# Patient Record
Sex: FEMALE | Race: OTHER | HISPANIC OR LATINO | URBAN - METROPOLITAN AREA
[De-identification: names, ages, dates, MRNs, and addresses within clinical notes are randomized per-mention and may not be internally consistent; named-entity substitution may affect disease eponyms.]

---

## 2018-02-27 ENCOUNTER — TRANSCRIBE ORDERS (OUTPATIENT)
Dept: LAB | Facility: HOSPITAL | Age: 38
End: 2018-02-27

## 2020-03-02 ENCOUNTER — OFFICE VISIT (OUTPATIENT)
Dept: OBGYN CLINIC | Facility: CLINIC | Age: 40
End: 2020-03-02
Payer: COMMERCIAL

## 2020-03-02 VITALS
DIASTOLIC BLOOD PRESSURE: 86 MMHG | HEIGHT: 62 IN | SYSTOLIC BLOOD PRESSURE: 142 MMHG | BODY MASS INDEX: 35.88 KG/M2 | WEIGHT: 195 LBS

## 2020-03-02 DIAGNOSIS — N80.9 ENDOMETRIOSIS DETERMINED BY LAPAROSCOPY: ICD-10-CM

## 2020-03-02 DIAGNOSIS — G90.521 COMPLEX REGIONAL PAIN SYNDROME TYPE 1 AFFECTING RIGHT PELVIC REGION AND THIGH: Primary | ICD-10-CM

## 2020-03-02 DIAGNOSIS — R10.2 PELVIC PAIN: ICD-10-CM

## 2020-03-02 PROCEDURE — 99204 OFFICE O/P NEW MOD 45 MIN: CPT | Performed by: OBSTETRICS & GYNECOLOGY

## 2020-03-02 RX ORDER — NICOTINE POLACRILEX 2 MG
1 GUM BUCCAL EVERY OTHER DAY
COMMUNITY

## 2020-03-02 RX ORDER — UREA 10 %
800 LOTION (ML) TOPICAL DAILY
COMMUNITY

## 2020-03-02 NOTE — PROGRESS NOTES
The patient is here because she is having pelvic pain in her lower right side  The patient is having a sharp pain  It is painful when she walks and she has painful intercourse  She has had the cramping for 6-7 months  No bleeding or vaginal discharge  She had a hysterectomy and has her right ovary  She last saw a gyn in [facility] in 1/2019 or 12/2018

## 2020-03-02 NOTE — PROGRESS NOTES
Assessment/Plan:     1  Chronic right pelvic pain  2  History of endometriosis  3  Status post LAVH RSO for stage IV endometriosis   4  Status post diagnostic laparoscopy 2017 per a right cystectomy and lysis of adhesions 5  Status post 6 months treatment of Lupron last shot 2018  Plan:  1  Obtain records 2  Request for pelvic ultrasound given  3  Information on orlissa  given   4  Laparoscopic RSO discussed     Subjective:     Patient ID: Kelly Jimenez is a 37 y  o  female presents as a new patient who just moved from [place] complaining of right chronic pelvic pain x6 months  Her history significant for endometriosis resulting and LAVH LSO in   She was doing well up until  17 when she underwent a diagnostic laparoscopy for what she states was a cystectomy the right ovary and possible lysis of adhesions  This was followed by 6 months of Lupron which ended 2018  She had a normal [address] prior to treatment  Her 2 deliveries were  at term  Her pain with endometriosis started at age 19  Presently she has sharp pain on her right side worse with standing or bending and also states she has dyspareunia  She denies any bowel issues and occasional urinary frequency  She does states she drinks a lot of water  She also had a laparoscopic cholecystectomy  Objective:     Physical Exam   Abdominal: Soft  Bowel sounds are normal  She exhibits no distension and no mass  There is no tenderness  There is no rebound and no guarding  No hernia  Laparoscopy sites well healed   Genitourinary: Vagina normal    Genitourinary Comments: External genitalia normal vagina clear vaginal cuff well supported  No cystocele or rectocele noted  No discomfort on insertion of speculum  No tenderness on the left side  Positive tenderness in the right adnexa no masses appreciated  No vaginal cuff tenderness

## 2020-03-07 ENCOUNTER — HOSPITAL ENCOUNTER (OUTPATIENT)
Dept: RADIOLOGY | Facility: HOSPITAL | Age: 40
Discharge: HOME | End: 2020-03-07
Attending: OBSTETRICS & GYNECOLOGY
Payer: COMMERCIAL

## 2020-03-07 DIAGNOSIS — R10.2 PELVIC PAIN: ICD-10-CM

## 2020-03-07 DIAGNOSIS — N80.9 ENDOMETRIOSIS DETERMINED BY LAPAROSCOPY: ICD-10-CM

## 2020-03-07 PROCEDURE — 76856 US EXAM PELVIC COMPLETE: CPT

## 2020-03-07 PROCEDURE — 76830 TRANSVAGINAL US NON-OB: CPT

## 2020-03-13 ENCOUNTER — TELEPHONE (OUTPATIENT)
Dept: OBGYN CLINIC | Facility: CLINIC | Age: 40
End: 2020-03-13

## 2020-03-13 NOTE — TELEPHONE ENCOUNTER
I advised pt that her pelvic u/s was normal   Pt wants to know what the next step is    Does she need an appt to discuss or can you tell her what her options are?

## 2020-03-18 NOTE — TELEPHONE ENCOUNTER
Left pt a detailed message that she needs to schedule an OV with Dr Lourdes Borrego to discuss treatment options

## 2020-03-19 ENCOUNTER — OFFICE VISIT (OUTPATIENT)
Dept: OBGYN CLINIC | Facility: CLINIC | Age: 40
End: 2020-03-19
Payer: COMMERCIAL

## 2020-03-19 VITALS
DIASTOLIC BLOOD PRESSURE: 80 MMHG | HEIGHT: 63 IN | BODY MASS INDEX: 35.61 KG/M2 | SYSTOLIC BLOOD PRESSURE: 160 MMHG | WEIGHT: 201 LBS

## 2020-03-19 DIAGNOSIS — R10.2 PELVIC PAIN: Primary | ICD-10-CM

## 2020-03-19 DIAGNOSIS — N80.9 ENDOMETRIOSIS DETERMINED BY LAPAROSCOPY: ICD-10-CM

## 2020-03-19 PROCEDURE — 99213 OFFICE O/P EST LOW 20 MIN: CPT | Performed by: OBSTETRICS & GYNECOLOGY

## 2020-03-19 NOTE — PROGRESS NOTES
Patient returns to discussed ultrasound findings  See previous note  Patient has a history of endometriosis  Status post hysterectomy and LSO  Persistent right pelvic pain  Status post diagnostic laparoscopy 2017 for lysis of adhesions and right cystectomy  This was followed by 6 months of Lupron  Pelvic ultrasound reveals a normal ovary with no other abnormalities  Impression:  1  History of endometriosis status post hysterectomy LSO 2  Persistent right pelvic pain 3  Normal right ovary on ultrasound  Plan:  Trial of [id number] mg daily    If no relief of right pelvic pain n 3 months, schedule diagnostic laparoscopy possible RSO

## 2020-03-19 NOTE — PROGRESS NOTES
The patient is here to discuss treatment options for pelvic pain  The patient has had pelvic pain for a few months  No bleeding

## 2020-03-30 ENCOUNTER — TELEPHONE (OUTPATIENT)
Dept: OBGYN CLINIC | Facility: CLINIC | Age: 40
End: 2020-03-30

## 2020-03-30 RX ORDER — OXYCODONE HYDROCHLORIDE AND ACETAMINOPHEN 5; 325 MG/1; MG/1
1 TABLET ORAL EVERY 4 HOURS PRN
Qty: 10 TABLET | Refills: 0 | Status: SHIPPED | OUTPATIENT
Start: 2020-03-30 | End: 2022-05-18 | Stop reason: ALTCHOICE

## 2020-03-30 NOTE — TELEPHONE ENCOUNTER
Pt is calling asking if there is anything stronger that you can give her for her pain and being uncomfortable until the Linnell Riedel is approved  She states it is uncomfortable for her to sit  She has been alternating tylenol with ibuprofen but she is getting such an upset stomach  Please advise

## 2020-04-01 DIAGNOSIS — R10.2 PELVIC PAIN: ICD-10-CM

## 2020-04-01 DIAGNOSIS — N80.9 ENDOMETRIOSIS DETERMINED BY LAPAROSCOPY: Primary | ICD-10-CM

## 2020-04-21 ENCOUNTER — TELEPHONE (OUTPATIENT)
Dept: OBGYN CLINIC | Facility: CLINIC | Age: 40
End: 2020-04-21

## 2020-06-30 ENCOUNTER — TELEPHONE (OUTPATIENT)
Dept: OBGYN CLINIC | Facility: CLINIC | Age: 40
End: 2020-06-30

## 2020-06-30 NOTE — TELEPHONE ENCOUNTER
Pt on Orilissa since April  Still having dyspareunia and moderate cramping pelvic pain  Also, having hot flashes daily where she needs to change her shirt and it's embarrassing at work  Has constant headaches and nausea  Is hydrating  She is questioning continuation at this time due to these symptoms or is there anything to help with these symptoms?

## 2020-07-01 ENCOUNTER — TELEPHONE (OUTPATIENT)
Dept: OBGYN CLINIC | Facility: CLINIC | Age: 40
End: 2020-07-01

## 2020-07-01 NOTE — TELEPHONE ENCOUNTER
The [address] [place] [address] called to follow up on a prior-authorization for Dexter  She said to call back at [phone]

## 2020-07-02 NOTE — TELEPHONE ENCOUNTER
I called [facility] and spoke to Delores  I advised her that pt doesn't want to take the medication due to her side effects  After her appt with Dr Donte Cloud if she decides to try it again I will get authoriztion for it

## 2020-07-02 NOTE — TELEPHONE ENCOUNTER
Spoke with pt  She is going to check her schedule at work and see when she can come in for an appt  She does have an appt scheduled for 7/27/20 but did want to try to be seen soone

## 2020-07-09 NOTE — TELEPHONE ENCOUNTER
Left message asking pt if she was able to check her schedule for a sooner appt then what she is scheduled for

## 2020-08-03 ENCOUNTER — NURSE TRIAGE (OUTPATIENT)
Dept: OTHER | Facility: OTHER | Age: 40
End: 2020-08-03

## 2020-08-03 DIAGNOSIS — Z20.822 ENCOUNTER FOR LABORATORY TESTING FOR SEVERE ACUTE RESPIRATORY SYNDROME CORONAVIRUS 2 (SARS-COV-2): Primary | ICD-10-CM

## 2020-08-03 DIAGNOSIS — Z20.822 ENCOUNTER FOR LABORATORY TESTING FOR SEVERE ACUTE RESPIRATORY SYNDROME CORONAVIRUS 2 (SARS-COV-2): ICD-10-CM

## 2020-08-03 PROCEDURE — U0003 INFECTIOUS AGENT DETECTION BY NUCLEIC ACID (DNA OR RNA); SEVERE ACUTE RESPIRATORY SYNDROME CORONAVIRUS 2 (SARS-COV-2) (CORONAVIRUS DISEASE [COVID-19]), AMPLIFIED PROBE TECHNIQUE, MAKING USE OF HIGH THROUGHPUT TECHNOLOGIES AS DESCRIBED BY CMS-2020-01-R: HCPCS | Performed by: FAMILY MEDICINE

## 2020-08-03 NOTE — TELEPHONE ENCOUNTER
Regarding: [id number] (1 of 4)  ----- Message from Amy Vazquez sent at 8/3/2020  9:21 AM EDT -----  "I would like a COVID-19 test "

## 2020-08-03 NOTE — TELEPHONE ENCOUNTER
Reason for Disposition   [1] Travel from area with community spread (identified by [facility]) AND [2] within last 14 days BUT [3] NO symptoms    Answer Assessment - Initial Assessment Questions  1  CLOSE CONTACT: "Who is the person with the confirmed or suspected COVID-19 infection that you were exposed to?"      *No Answer*  2  PLACE of CONTACT: "Where were you when you were exposed to COVID-19?" (e g , home, school, medical waiting room; which city?)      *No Answer*  3  TYPE of CONTACT: "How much contact was there?" (e g , sitting next to, live in same house, work in same office, same building)      Denied known exposure  6  TRAVEL: "Have you traveled out of the country recently?" If so, "When and where?"      * Also ask about out-of-state travel, since the CDC has identified some high-risk cities for community spread in the [address] [place] [address],[address]  * Note: Travel becomes less relevant if there is widespread community transmission where the patient lives  Drove to [place], [place] on 7/30/2020-8/1/2020  7  COMMUNITY SPREAD: "Are there lots of cases of COVID-19 (community spread) where you live?" (See public health department website, if unsure)        Lives in [place], [place]  8  SYMPTOMS: "Do you have any symptoms?" (e g , fever, cough, breathing difficulty)      Asymptomatic  9  PREGNANCY OR POSTPARTUM: "Is there any chance you are pregnant?" "When was your last menstrual period?" "Did you deliver in the last 2 weeks?"    Had a hysterectomy 2013  10  HIGH RISK: "Do you have any heart or lung problems?  Do you have a weak immune system?" (e g , CHF, COPD, asthma, HIV positive, chemotherapy, renal failure, diabetes mellitus, sickle cell anemia)        Denied    Protocols used: CORONAVIRUS (COVID-19) EXPOSURE-ADULT-[place]

## 2020-08-04 LAB — SARS-COV-2 RNA SPEC QL NAA+PROBE: NOT DETECTED

## 2020-08-06 ENCOUNTER — OFFICE VISIT (OUTPATIENT)
Dept: OBGYN CLINIC | Facility: CLINIC | Age: 40
End: 2020-08-06
Payer: COMMERCIAL

## 2020-08-06 VITALS
HEIGHT: 64 IN | BODY MASS INDEX: 34.31 KG/M2 | DIASTOLIC BLOOD PRESSURE: 82 MMHG | WEIGHT: 201 LBS | SYSTOLIC BLOOD PRESSURE: 140 MMHG

## 2020-08-06 DIAGNOSIS — N80.9 ENDOMETRIOSIS: Primary | ICD-10-CM

## 2020-08-06 DIAGNOSIS — N95.8 ARTIFICIAL MENOPAUSE: ICD-10-CM

## 2020-08-06 PROCEDURE — 99213 OFFICE O/P EST LOW 20 MIN: CPT | Performed by: OBSTETRICS & GYNECOLOGY

## 2020-08-06 RX ORDER — ACETAMINOPHEN AND CODEINE PHOSPHATE 120; 12 MG/5ML; MG/5ML
1 SOLUTION ORAL DAILY
Qty: 30 TABLET | Refills: 12 | Status: SHIPPED | OUTPATIENT
Start: 2020-08-06 | End: 2022-05-18 | Stop reason: ALTCHOICE

## 2020-08-06 NOTE — PROGRESS NOTES
The patient is here for a follow-up for orilissa  The patient was having severe hot flashes,frequent headaches and the patient felt nausea while on the orilissa  The patient was on orilissa for about three months  Once she stopped the [place], most of the symptoms stopped  She still has hot flashes once in a while

## 2020-08-06 NOTE — PROGRESS NOTES
Returns to the office complaining of hot flashes and night sweats on Oroilisa for the treatment of endometriosis  Patient states that pain with intercourse has improved  Patient is status post hysterectomy and LSO  Has her right ovary with a normal ultrasound  Patient would like to continue the medication and asked if there was any help for the hot flashes  At the present time we can add progesterone only pill  Impression:  Pain of endometriosis improving on Orillissa  Severe hot flashes and night sweats  Plan:  Restart medication in add progesterone only pill  Call with update in a few weeks  Return to office in 3 months for re-evaluation  As stated before, if pain is not getting in better we may plan a laparoscopic RSO    Patient understands the implications of surgical menopause and ERT was discussed

## 2020-08-20 ENCOUNTER — TELEPHONE (OUTPATIENT)
Dept: OBGYN CLINIC | Facility: CLINIC | Age: 40
End: 2020-08-20

## 2020-08-20 NOTE — TELEPHONE ENCOUNTER
Spoke with Dr Donte Cloud and pt is taking Micronor birth control  Pt will take this as her trial of birth control  Pt advised

## 2020-08-20 NOTE — TELEPHONE ENCOUNTER
I went through the steps of trying to get prior authorization for pt's Zaira Knowles and it was denied because the pt hasn't tried 3 months of oral contraceptives and failed  I have not notified the pt yet  What would you like to do?

## 2020-09-24 ENCOUNTER — OFFICE VISIT (OUTPATIENT)
Dept: OBGYN CLINIC | Facility: CLINIC | Age: 40
End: 2020-09-24
Payer: COMMERCIAL

## 2020-09-24 VITALS
WEIGHT: 207 LBS | DIASTOLIC BLOOD PRESSURE: 84 MMHG | BODY MASS INDEX: 35.34 KG/M2 | SYSTOLIC BLOOD PRESSURE: 140 MMHG | HEIGHT: 64 IN

## 2020-09-24 DIAGNOSIS — N64.4 PAIN OF BOTH BREASTS: Primary | ICD-10-CM

## 2020-09-24 PROCEDURE — 99213 OFFICE O/P EST LOW 20 MIN: CPT | Performed by: OBSTETRICS & GYNECOLOGY

## 2020-09-24 RX ORDER — MELOXICAM 15 MG/1
TABLET ORAL
COMMUNITY
Start: 2020-07-28 | End: 2022-05-18 | Stop reason: ALTCHOICE

## 2020-09-24 NOTE — PROGRESS NOTES
The patient is here because she is having bilateral breast pain  The pain is underneath her armpit and on the sides of her breast  She has sharp pain on the sides of her breast  She has the pain when she moves and randomly on and off  If she lifts her arms or moves in bed, she has pain  She has had the pain since 3/2020  She feels like the sides of her breasts are swollen

## 2020-09-24 NOTE — PROGRESS NOTES
Presents to the office complaining of bilateral breast pain in the outer aspect radiating to her axilla  Patient states that they are pain is worse at night  She describes the pain in her breast as shooting pain  She denies any masses skin changes or nipple discharge  Her history significant for hysterectomy LSO for severe endometriosis  She was on Orilissa for endometriosis and was later placed on progesterone only pill to alleviate the hot flashes and night sweats on her medication  She stopped herOrilissa 2 months ago and has been on progesterone only pill  She received a steroid injection for her left ankle and has noted weight gain  She was seen in August of 2020 at which time her weight was 201 lb  She is presently 207 lb  She has not changed her bra size since the weight gain  Physical exam:  Breast exam:  No skin changes noted nipple discharge no masses no axillary nodes bilaterally    Impression:  New onset of bilateral breast pain  Recent 6 lb weight gain  On a progesterone only pill  Plan:  Check diagnostic bilateral mammogram   Stop progesterone only pill  Encouraged to lose weight  Considered getting a new bra    Call with update

## 2020-11-05 ENCOUNTER — OFFICE VISIT (OUTPATIENT)
Dept: OBGYN CLINIC | Facility: CLINIC | Age: 40
End: 2020-11-05
Payer: COMMERCIAL

## 2020-11-05 VITALS
DIASTOLIC BLOOD PRESSURE: 80 MMHG | SYSTOLIC BLOOD PRESSURE: 140 MMHG | BODY MASS INDEX: 36.44 KG/M2 | HEIGHT: 62 IN | WEIGHT: 198 LBS

## 2020-11-05 DIAGNOSIS — N80.9 ENDOMETRIOSIS DETERMINED BY LAPAROSCOPY: ICD-10-CM

## 2020-11-05 DIAGNOSIS — N95.1 SYMPTOMS, SUCH AS FLUSHING, SLEEPLESSNESS, HEADACHE, LACK OF CONCENTRATION, ASSOCIATED WITH THE MENOPAUSE: Primary | ICD-10-CM

## 2020-11-05 PROCEDURE — 99213 OFFICE O/P EST LOW 20 MIN: CPT | Performed by: OBSTETRICS & GYNECOLOGY

## 2020-11-05 RX ORDER — METOPROLOL SUCCINATE 50 MG/1
50 TABLET, EXTENDED RELEASE ORAL DAILY
COMMUNITY
Start: 2020-10-26 | End: 2022-05-18 | Stop reason: ALTCHOICE

## 2020-11-05 RX ORDER — AMLODIPINE BESYLATE 5 MG/1
5 TABLET ORAL DAILY
COMMUNITY
Start: 2020-10-30 | End: 2021-10-30

## 2020-11-05 RX ORDER — AMOXICILLIN 500 MG/1
500 CAPSULE ORAL EVERY 8 HOURS SCHEDULED
COMMUNITY
End: 2022-05-18 | Stop reason: ALTCHOICE

## 2020-12-07 ENCOUNTER — TELEMEDICINE (OUTPATIENT)
Dept: OBGYN CLINIC | Facility: CLINIC | Age: 40
End: 2020-12-07
Payer: COMMERCIAL

## 2020-12-07 DIAGNOSIS — N95.1 VASOMOTOR SYMPTOMS DUE TO MENOPAUSE: Primary | ICD-10-CM

## 2020-12-07 PROCEDURE — 99213 OFFICE O/P EST LOW 20 MIN: CPT | Performed by: OBSTETRICS & GYNECOLOGY

## 2021-01-19 ENCOUNTER — ANNUAL EXAM (OUTPATIENT)
Dept: OBGYN CLINIC | Facility: CLINIC | Age: 41
End: 2021-01-19
Payer: COMMERCIAL

## 2021-01-19 VITALS
SYSTOLIC BLOOD PRESSURE: 124 MMHG | BODY MASS INDEX: 38.83 KG/M2 | WEIGHT: 211 LBS | HEIGHT: 62 IN | DIASTOLIC BLOOD PRESSURE: 84 MMHG

## 2021-01-19 DIAGNOSIS — Z01.419 ENCOUNTER FOR WELL WOMAN EXAM: Primary | ICD-10-CM

## 2021-01-19 DIAGNOSIS — Z12.31 ENCOUNTER FOR SCREENING MAMMOGRAM FOR BREAST CANCER: ICD-10-CM

## 2021-01-19 DIAGNOSIS — Z12.39 ENCOUNTER FOR SCREENING BREAST EXAMINATION: ICD-10-CM

## 2021-01-19 PROCEDURE — 99396 PREV VISIT EST AGE 40-64: CPT | Performed by: PHYSICIAN ASSISTANT

## 2021-01-19 NOTE — PROGRESS NOTES
Assessment/Plan:    No problem-specific Assessment & Plan notes found for this encounter  Diagnoses and all orders for this visit:    Encounter for well woman exam    Encounter for screening mammogram for breast cancer  -     Mammo screening bilateral w 3d & cad; Future    Encounter for screening breast examination          Subjective:      Patient ID: Domenico Kunz is a 41 y o  female  Pt presents for her annual exam today--  She has no complaints  She has no bleeding or pelvic pain--hyster  Bowel and bladder are regular  No breast concerns today  Last mammo--due      No pap today  rx mammo  Daily mvi      The following portions of the patient's history were reviewed and updated as appropriate: allergies, current medications, past family history, past medical history, past social history, past surgical history and problem list     Review of Systems   Constitutional: Negative for chills, fever and unexpected weight change  Gastrointestinal: Negative for abdominal pain, blood in stool, constipation and diarrhea  Genitourinary: Negative  Objective:      /84   Ht 5' 2" (1 575 m)   Wt 95 7 kg (211 lb)   LMP 04/01/2013 (Approximate)   BMI 38 59 kg/m²          Physical Exam  Vitals signs and nursing note reviewed  Constitutional:       Appearance: She is well-developed  HENT:      Head: Normocephalic and atraumatic  Neck:      Musculoskeletal: Normal range of motion  Chest:      Breasts:         Right: No inverted nipple, mass, nipple discharge or skin change  Left: No inverted nipple, mass, nipple discharge or skin change  Abdominal:      Palpations: Abdomen is soft  Genitourinary:     Exam position: Supine  Labia:         Right: No rash, tenderness or lesion  Left: No rash, tenderness or lesion  Vagina: Normal       Cervix: No cervical motion tenderness, discharge or friability  Uterus: Absent         Adnexa:         Right: No mass, tenderness or fullness  Left: No mass, tenderness or fullness  Lymphadenopathy:      Lower Body: No right inguinal adenopathy  No left inguinal adenopathy

## 2021-01-19 NOTE — PROGRESS NOTES
Patient is here for yearly exam  Patient has no bleeding or pelvic pain  Patient is not due for a pap smear        HYSTER, has one ovary,

## 2021-12-09 ENCOUNTER — TELEPHONE (OUTPATIENT)
Dept: OBGYN CLINIC | Facility: CLINIC | Age: 41
End: 2021-12-09

## 2022-01-24 ENCOUNTER — ANNUAL EXAM (OUTPATIENT)
Dept: OBGYN CLINIC | Facility: CLINIC | Age: 42
End: 2022-01-24
Payer: COMMERCIAL

## 2022-01-24 VITALS
SYSTOLIC BLOOD PRESSURE: 142 MMHG | WEIGHT: 217 LBS | BODY MASS INDEX: 39.93 KG/M2 | HEIGHT: 62 IN | DIASTOLIC BLOOD PRESSURE: 80 MMHG

## 2022-01-24 DIAGNOSIS — Z12.39 ENCOUNTER FOR SCREENING BREAST EXAMINATION: ICD-10-CM

## 2022-01-24 DIAGNOSIS — Z12.31 ENCOUNTER FOR SCREENING MAMMOGRAM FOR MALIGNANT NEOPLASM OF BREAST: ICD-10-CM

## 2022-01-24 DIAGNOSIS — Z01.419 ENCOUNTER FOR WELL WOMAN EXAM: Primary | ICD-10-CM

## 2022-01-24 DIAGNOSIS — Z87.42 HISTORY OF ENDOMETRIOSIS: ICD-10-CM

## 2022-01-24 PROCEDURE — 99396 PREV VISIT EST AGE 40-64: CPT | Performed by: PHYSICIAN ASSISTANT

## 2022-01-24 PROCEDURE — 0503F POSTPARTUM CARE VISIT: CPT | Performed by: PHYSICIAN ASSISTANT

## 2022-01-24 RX ORDER — METOPROLOL SUCCINATE 25 MG/1
25 TABLET, EXTENDED RELEASE ORAL DAILY
COMMUNITY

## 2022-01-24 NOTE — PROGRESS NOTES
The patient is here for a yearly  The patient had a hysterectomy  No cramping  The patient has occasional red bleeding a few times a month  The patient has some mild pelvic cramping in her right side  The pain will radiate to her lower back  The patient is not having hot flashes or night sweats as much since starting estroven  No vaginal, bowel, bladder or breast problems

## 2022-01-24 NOTE — PROGRESS NOTES
Assessment/Plan:    No problem-specific Assessment & Plan notes found for this encounter  Diagnoses and all orders for this visit:    Encounter for well woman exam    Encounter for screening breast examination    Encounter for screening mammogram for malignant neoplasm of breast  -     Mammo screening bilateral w 3d & cad; Future    History of endometriosis    Other orders  -     metoprolol succinate (TOPROL-XL) 25 mg 24 hr tablet; Take 25 mg by mouth daily  -     Rhubarb (ESTROVEN COMPLETE PO); Take by mouth          Subjective:      Patient ID: Donte Mascorro is a 35 y o  female  Pt presents for her annual exam today--  She has no complaints except occ RLQ cramping  And occ BRBPR  Had colonoscopy years ago--normal  Bowel and bladder are regular  Ho endometriosis  S/p hyster--lso  No breast concerns today  Last mammo--2019~  nrml tsh 11/20  fsh > 30 in 2020  estroven helps w vasomotor sxs      No pap today  rx mammo      The following portions of the patient's history were reviewed and updated as appropriate: allergies, current medications, past family history, past medical history, past social history, past surgical history and problem list     Review of Systems   Constitutional: Negative for chills, fever and unexpected weight change  Gastrointestinal: Negative for abdominal pain, blood in stool, constipation and diarrhea  Genitourinary: Negative  Objective:      /80   Ht 5' 2" (1 575 m)   Wt 98 4 kg (217 lb)   LMP 04/01/2013 (Approximate)   BMI 39 69 kg/m²          Physical Exam  Vitals and nursing note reviewed  Constitutional:       Appearance: She is well-developed  HENT:      Head: Normocephalic and atraumatic  Chest:   Breasts:      Right: No inverted nipple, mass, nipple discharge or skin change  Left: No inverted nipple, mass, nipple discharge or skin change  Abdominal:      Palpations: Abdomen is soft  Genitourinary:     Exam position: Supine  Labia:         Right: No rash, tenderness or lesion  Left: No rash, tenderness or lesion  Vagina: Normal       Cervix: No cervical motion tenderness, discharge or friability  Uterus: Absent  Adnexa:         Right: No mass, tenderness or fullness  Left: No mass, tenderness or fullness  Musculoskeletal:      Cervical back: Normal range of motion  Lymphadenopathy:      Lower Body: No right inguinal adenopathy  No left inguinal adenopathy

## 2022-04-03 ENCOUNTER — HOSPITAL ENCOUNTER (OUTPATIENT)
Dept: MAMMOGRAPHY | Facility: HOSPITAL | Age: 42
Discharge: HOME | End: 2022-04-03
Payer: COMMERCIAL

## 2022-04-03 DIAGNOSIS — Z12.31 ENCOUNTER FOR SCREENING MAMMOGRAM FOR MALIGNANT NEOPLASM OF BREAST: ICD-10-CM

## 2022-04-03 PROCEDURE — 77063 BREAST TOMOSYNTHESIS BI: CPT

## 2022-04-03 PROCEDURE — 77067 SCR MAMMO BI INCL CAD: CPT

## 2022-04-23 ENCOUNTER — HOSPITAL ENCOUNTER (OUTPATIENT)
Dept: RADIOLOGY | Facility: HOSPITAL | Age: 42
Discharge: HOME | End: 2022-04-23
Attending: INTERNAL MEDICINE
Payer: COMMERCIAL

## 2022-04-23 DIAGNOSIS — K31.84 GASTROPARESIS: ICD-10-CM

## 2022-04-23 PROCEDURE — 78264 GASTRIC EMPTYING IMG STUDY: CPT

## 2022-04-23 PROCEDURE — A9541 TC99M SULFUR COLLOID: HCPCS

## 2022-04-23 PROCEDURE — G1004 CDSM NDSC: HCPCS

## 2022-05-17 ENCOUNTER — TELEPHONE (OUTPATIENT)
Dept: BARIATRICS | Facility: CLINIC | Age: 42
End: 2022-05-17

## 2022-05-17 NOTE — TELEPHONE ENCOUNTER
Received call from Pt re: weight loss options  Pt stated she is unsure of whether she is interested in medical or surgical  Checked Pt's BMI (40 3)  Pt reports high blood pressure and currently taking 2 medications  Sent online seminar to Pt for her review  Offered to transfer Pt to medical weight management for information  Pt was agreeable to this plan

## 2022-05-18 ENCOUNTER — OFFICE VISIT (OUTPATIENT)
Dept: BARIATRICS | Facility: CLINIC | Age: 42
End: 2022-05-18
Payer: COMMERCIAL

## 2022-05-18 VITALS
DIASTOLIC BLOOD PRESSURE: 80 MMHG | SYSTOLIC BLOOD PRESSURE: 124 MMHG | HEART RATE: 76 BPM | RESPIRATION RATE: 16 BRPM | WEIGHT: 217.1 LBS | HEIGHT: 61 IN | BODY MASS INDEX: 40.99 KG/M2

## 2022-05-18 DIAGNOSIS — I10 ESSENTIAL HYPERTENSION: ICD-10-CM

## 2022-05-18 DIAGNOSIS — E66.01 OBESITY, CLASS III, BMI 40-49.9 (MORBID OBESITY) (HCC): Primary | ICD-10-CM

## 2022-05-18 DIAGNOSIS — Z91.89 AT RISK FOR SLEEP APNEA: ICD-10-CM

## 2022-05-18 DIAGNOSIS — E78.2 MIXED HYPERLIPIDEMIA: ICD-10-CM

## 2022-05-18 PROBLEM — E66.813 OBESITY, CLASS III, BMI 40-49.9 (MORBID OBESITY): Status: ACTIVE | Noted: 2022-05-18

## 2022-05-18 PROCEDURE — 99204 OFFICE O/P NEW MOD 45 MIN: CPT | Performed by: NURSE PRACTITIONER

## 2022-05-18 RX ORDER — CHOLECALCIFEROL (VITAMIN D3) 1250 MCG
1 CAPSULE ORAL WEEKLY
COMMUNITY
Start: 2022-05-03

## 2022-05-18 RX ORDER — IPRATROPIUM BROMIDE 42 UG/1
1-2 SPRAY, METERED NASAL 4 TIMES DAILY PRN
COMMUNITY
Start: 2022-02-15

## 2022-05-18 RX ORDER — FLUTICASONE PROPIONATE 50 MCG
2 SPRAY, SUSPENSION (ML) NASAL DAILY
COMMUNITY
Start: 2021-05-20 | End: 2022-06-28

## 2022-05-18 RX ORDER — OMEPRAZOLE 20 MG/1
1 CAPSULE, DELAYED RELEASE ORAL DAILY
COMMUNITY
Start: 2022-03-22

## 2022-05-18 RX ORDER — IPRATROPIUM BROMIDE 42 UG/1
SPRAY, METERED NASAL
COMMUNITY
Start: 2022-02-16

## 2022-05-18 NOTE — PROGRESS NOTES
Assessment/Plan:  James Leger was seen today for consult  Diagnoses and all orders for this visit:    Obesity, Class III, BMI 40-49 9 (morbid obesity) ([facility]  - Discussed options of HealthyCORE-Intensive Lifestyle Intervention Program, Very Low Calorie Diet-VLCD, Conservative Program, Noe-En-Y Gastric Bypass and Vertical Sleeve Gastrectomy and the role of weight loss medications  - Explained the importance of making lifestyle changes first before starting any anti-obesity medications  Patient should demonstrate lifestyle changes first before anti-obesity medication can be initiated  - Patient is interested in pursuing Noe-En-Y Gastric Bypass and Vertical Sleeve Gastrectomy  She will be discussing all the options at home with her , but is leaning towards surgery at this time and would like to schedule a 3 hour surgical evaluation    - Initial weight loss goal of 5-10% weight loss for improved health  - Weight loss can improve patient's co-morbid conditions and/or prevent weight-related complications  - Avoid stimulants due to history of hypertension and anxiety  - Consider restarting Topamax at low dose, if she would decide to pursue medical weight management  - Stop bang 4/8  - Labs reviewed: CMP, Lipid panel and TSH from 4/29/2022  Chol, trig, and fasting glucose elevated, which should improve with weight loss  Otherwise labs within acceptable range  Goals:  Do not skip meals  Food log (ie ) www myfitnesspal com,sparkpeople  com,loseit com,calorieking  com,etc  baritastic (use skinnytaste  com, dietdoctor  com or smartphone laurie [email or web address].com for recipes)  No sugary beverages  At least 64oz of water daily  Increase physical activity by 10 minutes daily  Gradually increase physical activity to a goal of 5 days per week for 30 minutes of MODERATE intensity PLUS 2 days per week of FULL BODY resistance training (use smartphone apps [employer], Home Workout, etc )    Essential hypertension  - Taking norvasc  Continue management with prescribing provider  Mixed hyperlipidemia  - Will likely improve with weight loss  At risk for sleep apnea  - Stop Landon Lopez 4/8  - Risks of untreated sleep apnea reviewed, including increased risk for myocardial infarction and stroke, increased difficulty with weight loss, and risk of sudden cardiac death due to arrhythmia  Ambulatory referral to Sleep Medicine placed  Follow up in approximately 1 month with Surgical Dietician, Surgical  and Surgical   Subjective:   Chief Complaint   Patient presents with    Consult     MWM consult; waist 42 5in, goal wt 150; stop bang 4-8       Patient ID: Heaven Mulligan  is a 35 y o  female with excess weight/obesity here to pursue weight management  Previous notes and records have been reviewed  Past Medical History:   Diagnosis Date    Endometriosis     Hypertension      Past Surgical History:   Procedure Laterality Date    HYSTERECTOMY  2013    OOPHORECTOMY Left 2013       HPI:  Wt Readings from Last 20 Encounters:   05/18/22 98 5 kg (217 lb 1 6 oz)   01/24/22 98 4 kg (217 lb)   01/19/21 95 7 kg (211 lb)   11/05/20 89 8 kg (198 lb)   09/24/20 93 9 kg (207 lb)   08/06/20 91 2 kg (201 lb)   03/19/20 91 2 kg (201 lb)   03/02/20 88 5 kg (195 lb)     Obesity/Excess Weight:  Severity: Severe  Onset:  2021    Modifiers: Diet and Exercise and Commercial Weight Loss Programs-ie  Weight Watchers, Kandy Feller, Nutrisystem, etc   Contributing factors: Insufficient Physical Activity, Menopause, Medications and inrease in stress  Associated symptoms: decreased self esteem     Was previously on Topamax for headache prevention  Had some GI side effects, but started the dose higher than recommended  She would be willing to try this again       Hydration: 4-5 24 oz water,1 cup of iced coffee once a week with creamer and sugar, 2 cups of OJ or less per week, 2-3 Matcha tea per week, milk % once every 2 months  Alcohol: 1 drink per month  Smoking: denies  Exercise: walking 2 times per week for 2 5 miles, hikes on the weekends 1-2 per month 2 hours  Occupation: works at [employer]  Sleep: 6-7 hours  STOP ban/8    Highest weight: current  Current weight: 217 1 lbs  Goal weight: 150 lbs    Colonoscopy: N/A  Mammogram: UTD    The following portions of the patient's history were reviewed and updated as appropriate: allergies, current medications, past family history, past medical history, past social history, past surgical history, and problem list     Review of Systems   HENT: Negative for sore throat  Respiratory: Positive for cough (alleries)  Negative for shortness of breath  Cardiovascular: Negative for chest pain and palpitations  Gastrointestinal: Negative for constipation, diarrhea, nausea and vomiting         + GERD somewhat controlled with medication, recently restarted   Endocrine: Positive for cold intolerance (intermittent) and heat intolerance (intermittent)  Genitourinary: Negative for dysuria  Musculoskeletal: Negative for arthralgias and back pain  Skin: Negative for rash  Neurological: Positive for headaches (migraine)  Psychiatric/Behavioral: Negative for suicidal ideas (or HI)  Denies depression  + Anxiety - situational        Objective:  /80   Pulse 76   Resp 16   Ht 5' 1" (1 549 m)   Wt 98 5 kg (217 lb 1 6 oz)   LMP 2013 (Approximate)   Breastfeeding No   BMI 41 02 kg/m²     Physical Exam  Vitals and nursing note reviewed  Constitutional   General appearance: Abnormal   well developed and morbidly obese  Eyes No conjunctival injection  Ears, Nose, Mouth, and Throat Oral mucosa moist    Pulmonary   Respiratory effort: No increased work of breathing or signs of respiratory distress  Cardiovascular    Examination of extremities for edema and/or varicosities: Normal   no edema  Abdomen   Abdomen: Abnormal   The abdomen was obese  Musculoskeletal   Gait and station: Normal     Psychiatric   Orientation to person, place and time: Normal     Affect: appropriate

## 2022-06-28 ENCOUNTER — OFFICE VISIT (OUTPATIENT)
Dept: BARIATRICS | Facility: CLINIC | Age: 42
End: 2022-06-28

## 2022-06-28 VITALS
TEMPERATURE: 98.9 F | DIASTOLIC BLOOD PRESSURE: 76 MMHG | HEART RATE: 78 BPM | BODY MASS INDEX: 39.66 KG/M2 | WEIGHT: 215.5 LBS | HEIGHT: 62 IN | SYSTOLIC BLOOD PRESSURE: 120 MMHG

## 2022-06-28 DIAGNOSIS — Z01.818 PRE-OPERATIVE CLEARANCE: ICD-10-CM

## 2022-06-28 DIAGNOSIS — E66.01 MORBID OBESITY (HCC): Primary | ICD-10-CM

## 2022-06-28 DIAGNOSIS — I10 ESSENTIAL HYPERTENSION: ICD-10-CM

## 2022-06-28 DIAGNOSIS — E78.2 MIXED HYPERLIPIDEMIA: ICD-10-CM

## 2022-06-28 DIAGNOSIS — E66.01 OBESITY, CLASS III, BMI 40-49.9 (MORBID OBESITY) (HCC): Primary | ICD-10-CM

## 2022-06-28 PROCEDURE — RECHECK: Performed by: DIETITIAN, REGISTERED

## 2022-06-28 NOTE — PROGRESS NOTES
Bariatric Behavioral Health Evaluation    Presenting Problem:  Adamaris Munoz  is a 35 y o    female    :  1980   Patient presented with overall concerns of obesity  Stated that weight has impacted quality of life and has current/ future concerns with lack of mobility,/ movement,  chronic pain, and overall health  Has attempted various weight loss plans in the past including following lent practices and eliminating meat  Patient is Interested in exploring bariatric surgery  as an option for  weight loss goals  Is the patient seeking Bariatric Surgery Eval? Yes    Thinking about bariatric surgery for about two years  Knows someone who is post bariatric with success  Also knows someone post who returned to grazing and regained weight  Realizes Post- Op Requirements? 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- F  No hydration concerns  Drinks a lot of water during her work day  Activity:    Moderate walking  Has ankle pain after a fall at work  History of yoga  Lives with:   Son (25) and her Ryley Thakur (soon to be )  Shared cooking and shopping  Adult daughter out of the house  (nurse)     Family Constellation (include relationship with each and Psych/Med HX)  Grew up with mother and father and her brother:  Describes childhood as "eventful and adventurous"    Rules around food:   Ate what was served  No seconds  Found candy when in  middle school with her peers    Father would take family to get ice cream   Friday was a take out day ([email or web address])  She also does not cook on Friday's in her adult household  Psychiatric/Psychological Treatment Diagnosis:   Nothing in chart  Outpatient Counselor:   No               Psychiatrist:   No                Have you had Inpatient Treatment? No    Drug and/or Alcohol treatment history:   Denied       Tobacco History:  None noted and she denied     Domestic Violence No    Abuse History:  none -        Physical/Psychological Assessment:              Appearance: appropriate           Sociability: average           Affect: appropriate           Mood: calm           Thought Process: coherent           Speech: normal           Content: no impairment           Orientation: person  Yes , place  Yes , time  Yes , normal attention span  Yes , normal memory  Yes   and normal judgement  Yes            Insight: emotional  good      Risk Assessment:                Risk of Harm to Self or Others:   none noted during evaluation  No HI/SI                Observation: Interviews :  this interview only (will follow Clementina Roberto through the bariatric program)                Access to weapons : not reported                Based on the previous information, the client presents the following risk of harm to self or others: low      Recommendations: Recommended for surgery  yes       Note :  Patient presented for behavioral health evaluation for the bariatric program    Denied any  Mental Health diagnosis       No report of  therapy  No history of Psychiatry  Denied  Drug and/or Alcohol abuse or treatment  No tobacco use  Patient educated regarding the impact of nicotine and alcohol on the post surgery bariatric patient  Patient has a negative family history of tobacco and alcohol addiction  Patient meets criteria for surgery at this time and is referred to the bariatric surgeon            ISMAEL Villavicencio, FEIW  _____________________________      [facility] SURGERY EDUCATION CHECKLIST     Education related to my bariatric surgery process:     Patients may be required to complete a psychiatric evaluation and receive clearance for surgery from their psychiatrist     Patients who undergo weight loss surgery are at higher risk of increased mental health concerns and suicide attempts  Patients may be required to complete a full substance abuse evaluation and then complete all  treatment recommendations prior to surgery  If diagnosis of abuse/dependence results, patient may be required to remain sober for one (1) year before having bariatric surgery  Patient's on psychiatric medications should check with their provider to discuss psychiatric medications and the changes in absorption  Patient should discuss all time release medications with provider and take all medications as prescribed  The recommendation is that there is no use of any tobacco products, Hookah or  vapes for the bariatric post-operation patient  Bariatric surgery patients should not consume alcohol as a post-operative patient as it may increase risk of numerous health conditions including but not limited to alcohol abuse and ulcers  There is a possibility of weight regain if patient does not follow all program guidelines and recommendations  Bariatric surgery patients should exercise thirty (30) to sixty (60) minutes per day to maintain post-surgical weight loss  Research indicates that bariatric patients are more successful when they see a therapist for up to two (2) years post-op  Patients will follow all medical and dietary recommendations provided  Patient will keep all scheduled appointments and follow up with their physician for a minimum of five (5) years  Patient will take all vitamins as recommended  Post-operative vitamins are life-long  There is a goal month set  All requirements should be met by this time  Don't wait to get started! There is a deadline month set    All requirements must be finished by this time and if not, the patient will be halted in the surgery process  The patient can be referred to the medical weight management program or can come back to the surgical program once the unfinished tasks from the previous program are completed

## 2022-06-28 NOTE — PATIENT INSTRUCTIONS
Goals  Drink one shake and eat 2 meals   Clarify vitamin D dose with PCP   Activity as tolerated with ankle injury Ears: no ear pain and no hearing problems.Nose: no nasal congestion and no nasal drainage.Mouth/Throat: no dysphagia, no hoarseness and no throat pain.Neck: no lumps, no pain, no stiffness and no swollen glands.

## 2022-06-28 NOTE — PROGRESS NOTES
Bariatric Nutrition Assessment Note    Type of surgery    Preop  Surgery Date: Pt has 6 month program requirement     Surgeon: Dr Pretty Welch  35 y o   female     Wt with BMI of 25: 132 4#  Pre-Op Excess Wt: 83 1#  /76   Pulse 78   Temp 98 9 °F (37 2 °C) (Tympanic)   Ht 5' 1 5" (1 562 m)   Wt 97 8 kg (215 lb 8 oz)   LMP 04/01/2013 (Approximate)   BMI 40 06 kg/m²     Allegheny- St  Jeor Equation:  1578 kcal per day   Estimated calories for weight loss 893-1393 kcal per day  ( 1-2# per wk wt loss - sedentary )  Estimated protein needs 60-72 grams per day (1 0-1 2 gms/kg IBW )   Estimated fluid needs 70 ounces per day (-35 ml/kg IBW )      Weight History   Onset of Obesity: Adult- hysterectomy in 2014  Family history of obesity: No  Wt Loss Attempts: Commercial Programs ([employer], Karon Barton, etc )  Counseling with  MD  High Protein/Low CHO diets (Atkins, Union, etc )  Meal Replacements (Medifast, Slim Fast, etc )  OTC meds/supplements, noon   Patient has tried the above for 6 months or more with insufficient weight loss or weight regain, which is why patient has requested to be evaluated for weight loss surgery today  Maximum Wt Lost: 5 pound weight loss in 6 weeks while on Lent and eliminating all meat       Review of History and Medications   Past Medical History:   Diagnosis Date    Endometriosis     Hypertension      Past Surgical History:   Procedure Laterality Date    HYSTERECTOMY  2014    OOPHORECTOMY Left 2014     Social History     Socioeconomic History    Marital status: /Civil Union     Spouse name: None    Number of children: None    Years of education: None    Highest education level: None   Occupational History    None   Tobacco Use    Smoking status: Never Smoker    Smokeless tobacco: Never Used   Vaping Use    Vaping Use: Never used   Substance and Sexual Activity    Alcohol use: Not Currently    Drug use: Never  Sexual activity: Yes     Birth control/protection: Surgical   Other Topics Concern    None   Social History Narrative    None     Social Determinants of Health     Financial Resource Strain: Not on file   Food Insecurity: Not on file   Transportation Needs: Not on file   Physical Activity: Not on file   Stress: Not on file   Social Connections: Not on file   Intimate Partner Violence: Not on file   Housing Stability: Not on file       Current Outpatient Medications:     Biotin 1 MG CAPS, Take 1 capsule by mouth every other day, Disp: , Rfl:     ciclopirox (LOPROX) 0 77 % cream, APPLY TOPICALLY TO THE AFFECTED AREA TWICE DAILY  GENTLY MASSAGE INTO AFFECTED AREAS AND SURROUNDING SKIN, Disp: , Rfl:     fluticasone (FLONASE) 50 mcg/act nasal spray, 2 sprays into each nostril daily, Disp: , Rfl:     folic acid (FOLVITE) 525 MCG tablet, Take 800 mcg by mouth daily, Disp: , Rfl:     ipratropium (ATROVENT) 0 06 % nasal spray, 1-2 sprays into each nostril as needed in the morning and 1-2 sprays as needed at noon and 1-2 sprays as needed in the evening and 1-2 sprays as needed before bedtime  , Disp: , Rfl:     MAGNESIUM PO, Take by mouth, Disp: , Rfl:     metoprolol succinate (TOPROL-XL) 25 mg 24 hr tablet, Take 25 mg by mouth daily, Disp: , Rfl:     omeprazole (PriLOSEC) 20 mg delayed release capsule, Take 1 capsule by mouth in the morning , Disp: , Rfl:     Rhubarb (ESTROVEN COMPLETE PO), Take by mouth, Disp: , Rfl:     amLODIPine (NORVASC) 5 mg tablet, Take 5 mg by mouth daily, Disp: , Rfl:     Cholecalciferol (Vitamin D3) 1 25 MG (74569 UT) CAPS, Take 1 capsule by mouth once a week (Patient not taking: Reported on 6/28/2022), Disp: , Rfl:     ipratropium (ATROVENT) 0 06 % nasal spray, USE 1 TO 2 SPRAYS IN EACH NOSTRIL FOUR TIMES DAILY AS NEEDED FOR RHINITIS, Disp: , Rfl:      Food Intake and Lifestyle Assessment   Food Intake Assessment completed via usual diet recall  Breakfast: Coffee with 1/2 and 1/2 or creamer ( may add 1 tsp creamer )   Snack: 0  Lunch: 11:30 am - three times per week   Slice of pizza with salad ( take out ) or will pack lunch with left overs   Or wrap with chicken salad , sometimes cubed cheese , pretzels   Sometimes chicken wings or salad or soup when  if off takes her out to eat   Snack: 0  Dinner: cooks dinner - 6:30 pm to 7:00 pm   Picks while cooking   Chicken breast, vegetables, and starch ( quinoa, parboiled rice, brown rice, ) or mashed potatoes beans or pasta ( whole wheat )   Snack: no   Beverage intake: coffee/tea- lots of water   Protein supplement: none currently   Estimated protein intake per day: ~30-40 grams   Estimated fluid intake per day: 80 ounces of fluid per day   Meals eaten away from home: once or twice per week   Typical meal pattern: 1-2 meals per day and 0 snacks per day  Eating Behaviors: Consumption of high calorie/ high fat foods  Food allergies or intolerances: Allergies   Allergen Reactions    Terbinafine Swelling     Tongue swelling; not 100% positive    Iodine - Food Allergy     Other      Seasonal, seafood    Shellfish-Derived Products - Food Allergy      Had gallbladder out - so eggs and cheese cause diarrhea   Cultural or Lutheran considerations: Jainism /      Physical Assessment  Physical Activity  Types of exercise: Walking  Current physical limitations: ankle pain from previous injury   SOB and heat intolerant     Psychosocial Assessment   Support systems: mother and fiancee   Socioeconomic factors:oral surgeon full time     Nutrition Diagnosis  Diagnosis: Overweight / Obesity (NC-3 3)  Related to: Physical inactivity and Excessive energy intake  As Evidenced by: BMI >25 and Unintentional weight gain     Nutrition Prescription: Recommend the following diet  Low fat, Low sugar and High protein    Interventions and Teaching   Discussed pre-op and post-op nutrition guidelines  Patient educated and handouts provided    Surgical changes to stomach / GI  Capacity of post-surgery stomach  Diet progression  Adequate hydration  Sugar and fat restriction to decrease "dumping syndrome"  Fat restriction to decrease steatorrhea  Expected weight loss  Weight loss plateaus/ possibility of weight regain  Exercise  Suggestions for pre-op diet  Nutrition considerations after surgery  Protein supplements  Meal planning and preparation  Appropriate carbohydrate, protein, and fat intake, and food/fluid choices to maximize safe weight loss, nutrient intake, and tolerance   Dietary and lifestyle changes  Possible problems with poor eating habits  Intuitive eating  Techniques for self monitoring and keeping daily food journal  Potential for food intolerance after surgery, and ways to deal with them including: lactose intolerance, nausea, reflux, vomiting, diarrhea, food intolerance, appetite changes, gas  Vitamin / Mineral supplementation of Multivitamin with minerals, Calcium, Vitamin B12, Iron, Fat Soluble vitamins and Vitamin D  Post-operative pregnancy guidelines- Pt had hysterectomy     Patient is not currently pregnant and doesn't desire to become pregnant a minimum of one year post-op    Education provided to: patient and mother present and supportive     Barriers to learning: No barriers identified    Readiness to change: preparation and action    Prior research on procedure: discussed with provider and pre-op class    Comprehension: needs reinforcement and verbalizes understanding     Expected Compliance: good  Recommendations  Pt is an appropriate candidate for surgery   Yes    Evaluation / Monitoring  Dietitian to Monitor: Eating pattern as discussed Tolerance of nutrition prescription Body weight Lab values Physical activity Bowel pattern    Goals  Complete lession plans 1-6 and Eat 3 meals per day   Drink one shake and eat 2 meals   Clarify vitamin D dose with PCP   Activity as tolerated with ankle injury     Time Spent:   1 Hour

## 2022-08-18 NOTE — PROGRESS NOTES
Behavioral Health Follow Up Note:      1 / 6  Weight Check:  Dr Antoni Quiroz    Starting weight 215 5  #  Today's weight 216 8  #  Surgery goal 205  #    Surgery month:  Jan/ Feb  Surgery deadline: May    Mental health and wellness - had COVID last month  Works until [employer] tracking her food, but not on paper or in an AP  Feels she has been eating healthy for awhile  Mindful of her choices  Has some stomach issues and feels full quick  Avoids sweets  Eating behaviors - eating breakfast now  Small lunch  Trying to eat dinner  Finds when she is distracted she is not hungry and may forgot to eat dinner  Hydrating with water  Uses a 20 oz bottle and constantly refilling the water bottle  Uses a straw to drink the water and realizes it is a habit to  Use the straw  Will try to stop  No carbonated beverages  Activity -  Started to walking in the AM   Walking for 30 minutes  Active on the weekends  Did start Yoga at home  Trying to stretch more  Progress toward program requirements    Workflow:  · Psych and/or D+A Clearance: N/A  · PCP Letter: pending  · Support Group: pending  · Surgeon Appt : 9/14/2022  · EGD : pending  · Cardiac Risk Assessment: 8/22/2022  · Sleep Studies: 8/31/2022  · Bloodwork: pending       Goals:  1  Increase cardio at the gym  2   More structure with her meals during the day  3   Pay attention to her sleeping hours/ habits    4   Candy jar at work and she visits often at work   (avoid)

## 2022-08-19 ENCOUNTER — OFFICE VISIT (OUTPATIENT)
Dept: BARIATRICS | Facility: CLINIC | Age: 42
End: 2022-08-19

## 2022-08-19 VITALS — BODY MASS INDEX: 40.3 KG/M2 | WEIGHT: 216.8 LBS

## 2022-08-19 DIAGNOSIS — E66.01 OBESITY, CLASS III, BMI 40-49.9 (MORBID OBESITY) (HCC): Primary | ICD-10-CM

## 2022-08-19 PROCEDURE — RECHECK

## 2022-08-22 ENCOUNTER — DOCUMENTATION (OUTPATIENT)
Dept: CARDIOLOGY CLINIC | Facility: CLINIC | Age: 42
End: 2022-08-22

## 2022-08-22 ENCOUNTER — CONSULT (OUTPATIENT)
Dept: CARDIOLOGY CLINIC | Facility: CLINIC | Age: 42
End: 2022-08-22
Payer: COMMERCIAL

## 2022-08-22 VITALS
SYSTOLIC BLOOD PRESSURE: 122 MMHG | WEIGHT: 217.5 LBS | HEIGHT: 61 IN | BODY MASS INDEX: 41.07 KG/M2 | HEART RATE: 62 BPM | DIASTOLIC BLOOD PRESSURE: 78 MMHG

## 2022-08-22 DIAGNOSIS — E66.01 OBESITY, CLASS III, BMI 40-49.9 (MORBID OBESITY) (HCC): ICD-10-CM

## 2022-08-22 DIAGNOSIS — Z01.818 PREOPERATIVE CLEARANCE: Primary | ICD-10-CM

## 2022-08-22 DIAGNOSIS — E66.01 MORBID OBESITY (HCC): ICD-10-CM

## 2022-08-22 DIAGNOSIS — I10 ESSENTIAL HYPERTENSION: ICD-10-CM

## 2022-08-22 DIAGNOSIS — E78.2 MIXED HYPERLIPIDEMIA: ICD-10-CM

## 2022-08-22 PROCEDURE — 93000 ELECTROCARDIOGRAM COMPLETE: CPT | Performed by: INTERNAL MEDICINE

## 2022-08-22 PROCEDURE — 99244 OFF/OP CNSLTJ NEW/EST MOD 40: CPT | Performed by: INTERNAL MEDICINE

## 2022-08-22 NOTE — PROGRESS NOTES
Cardiology Consultation     Geena [facility]  [id number]  1980  HEART & VASCULAR [facility] [address] [id number]    1  Preoperative clearance  POCT ECG   2  Morbid obesity ([facility] [address] )  Ambulatory referral to Cardiology   3  Essential hypertension     4  Obesity, Class III, BMI 40-49 9 (morbid obesity) ([facility] )     5  Mixed hyperlipidemia       Chief Complaint   Patient presents with    Advice Only     Patient  here for cardiac clearance for bariatric surgery, date pending   Foot Swelling    Palpitations     HPI: Patient is here for cardiac evaluation prior to bariatric surgery  She has mild foot edema, more at the end of the day and occasional palpitations -- lasting seconds and intermittent  No reported chest pain, shortness of breath, lightheadedness, syncope, orthopnea, PND, or significant weight changes  Patient remains active without any increased fatigue out of the ordinary        Patient Active Problem List   Diagnosis    Essential hypertension    Obesity, Class III, BMI 40-49 9 (morbid obesity) ([facility] )    Mixed hyperlipidemia    Preoperative clearance     Past Medical History:   Diagnosis Date    Endometriosis     Hypertension      Social History     Socioeconomic History    Marital status: /Civil Union     Spouse name: Not on file    Number of children: Not on file    Years of education: Not on file    Highest education level: Not on file   Occupational History    Not on file   Tobacco Use    Smoking status: Never Smoker    Smokeless tobacco: Never Used   Vaping Use    Vaping Use: Never used   Substance and Sexual Activity    Alcohol use: Not Currently    Drug use: Never    Sexual activity: Yes     Birth control/protection: Surgical   Other Topics Concern    Not on file   Social History Narrative    Not on file     Social Determinants of Health     Financial Resource Strain: Not on file   Food Insecurity: Not on file   Transportation Needs: Not on file   Physical Activity: Not on file   Stress: Not on file   Social Connections: Not on file   Intimate Partner Violence: Not on file   Housing Stability: Not on file      Family History   Problem Relation Age of Onset    Hypertension Mother     Ovarian cancer Mother 37    Hyperlipidemia Mother     Hyperthyroidism Mother     Hyperlipidemia Father     Heart disease Father     Hypertension Father     Skin cancer Father     Stroke Father     No Known Problems Brother     No Known Problems Daughter     No Known Problems Son     Diabetes Maternal Aunt     Ovarian cancer Maternal Aunt     Diabetes Paternal Aunt     No Known Problems Maternal Grandmother     No Known Problems Maternal Grandfather     No Known Problems Paternal Grandmother     Heart disease Paternal Grandfather      Past Surgical History:   Procedure Laterality Date    HYSTERECTOMY  2014    OOPHORECTOMY Left 2014       Current Outpatient Medications:     Biotin 1 MG CAPS, Take 1 capsule by mouth every other day, Disp: , Rfl:     folic acid (FOLVITE) 527 MCG tablet, Take 800 mcg by mouth daily, Disp: , Rfl:     ipratropium (ATROVENT) 0 06 % nasal spray, 1-2 sprays into each nostril as needed in the morning and 1-2 sprays as needed at noon and 1-2 sprays as needed in the evening and 1-2 sprays as needed before bedtime  , Disp: , Rfl:     MAGNESIUM PO, Take by mouth, Disp: , Rfl:     metoprolol succinate (TOPROL-XL) 25 mg 24 hr tablet, Take 25 mg by mouth daily, Disp: , Rfl:     omeprazole (PriLOSEC) 20 mg delayed release capsule, Take 1 capsule by mouth daily Every other day 4omg, Disp: , Rfl:     Rhubarb (ESTROVEN COMPLETE PO), Take by mouth, Disp: , Rfl:     amLODIPine (NORVASC) 5 mg tablet, Take 5 mg by mouth daily, Disp: , Rfl:     Cholecalciferol (Vitamin D3) 1 25 MG (01691 UT) CAPS, Take 1 capsule by mouth once a week (Patient not taking: No sig reported), Disp: , Rfl:     ciclopirox (LOPROX) 0 77 % cream, APPLY TOPICALLY TO THE AFFECTED AREA TWICE DAILY  GENTLY MASSAGE INTO AFFECTED AREAS AND SURROUNDING SKIN (Patient not taking: No sig reported), Disp: , Rfl:     fluticasone (FLONASE) 50 mcg/act nasal spray, 2 sprays into each nostril daily (Patient not taking: Reported on 8/22/2022), Disp: , Rfl:     ipratropium (ATROVENT) 0 06 % nasal spray, USE 1 TO 2 SPRAYS IN EACH NOSTRIL FOUR TIMES DAILY AS NEEDED FOR RHINITIS (Patient not taking: No sig reported), Disp: , Rfl:   Allergies   Allergen Reactions    Terbinafine Swelling     Tongue swelling; not 100% positive    Iodine - Food Allergy     Other      Seasonal, seafood    Shellfish-Derived Products - Food Allergy      Vitals:    08/22/22 1106   BP: 122/78   BP Location: Left arm   Patient Position: Sitting   Cuff Size: Large   Pulse: 62   Weight: 98 7 kg (217 lb 8 oz)   Height: 5' 1" (1 549 m)       Labs:  No visits with results within 6 Month(s) from this visit  Latest known visit with results is:   Orders Only on 08/03/2020   Component Date Value    SARS-CoV-2  08/03/2020 Not Detected      No results found for: CHOL, TRIG, HDL, LDLDIRECT  Imaging: No results found  Review of Systems:  Review of Systems   Constitutional: Negative for activity change, appetite change, chills, diaphoresis, fatigue and unexpected weight change  HENT: Negative for hearing loss, nosebleeds and sore throat  Eyes: Negative for photophobia and visual disturbance  Respiratory: Negative for cough, chest tightness, shortness of breath and wheezing  Cardiovascular: Positive for palpitations and leg swelling  Negative for chest pain  Gastrointestinal: Negative for abdominal pain, diarrhea, nausea and vomiting  Endocrine: Negative for polyuria  Genitourinary: Negative for dysuria, frequency and hematuria  Musculoskeletal: Negative for arthralgias, back pain, gait problem and neck pain     Skin: Negative for pallor and rash  Neurological: Negative for dizziness, syncope and headaches  Hematological: Does not bruise/bleed easily  Psychiatric/Behavioral: Negative for behavioral problems and confusion  Physical Exam:  Physical Exam  Vitals reviewed  Constitutional:       Appearance: She is well-developed  She is not diaphoretic  HENT:      Head: Normocephalic and atraumatic  Nose: Nose normal    Eyes:      General: No scleral icterus  Pupils: Pupils are equal, round, and reactive to light  Neck:      Vascular: No JVD  Cardiovascular:      Rate and Rhythm: Normal rate and regular rhythm  Heart sounds: Normal heart sounds  No murmur heard  No friction rub  No gallop  Pulmonary:      Effort: Pulmonary effort is normal  No respiratory distress  Breath sounds: Normal breath sounds  No wheezing or rales  Abdominal:      General: Bowel sounds are normal  There is no distension  Palpations: Abdomen is soft  Tenderness: There is no abdominal tenderness  Musculoskeletal:         General: No deformity  Normal range of motion  Cervical back: Normal range of motion and neck supple  Skin:     General: Skin is warm and dry  Findings: No rash  Neurological:      Mental Status: She is alert and oriented to person, place, and time  Cranial Nerves: No cranial nerve deficit  Psychiatric:         Behavior: Behavior normal        Blood pressure 122/78, pulse 62, height 5' 1" (1 549 m), weight 98 7 kg (217 lb 8 oz), last menstrual period 04/01/2013, not currently breastfeeding  EKG:  Normal sinus rhythm  Normal ECG    Discussion/Summary:  Pre-op cardiac eval: with risk factors for CAD including HTN , HLD, family history of CAD and no active symptoms, exam, or EKG findings suggesting active ischemia, arrhythmia, or CHF  Mild LE edema at end of day is likely related to dependent edema or amlodipine    Proceed with planned intermediate risk bariatric surgery without any further cardiac testing  HTN: well controlled on current regimen, but she is interested in coming off meds  Can try trial off metoprolol and check BP, if remains in 140's-150's, can increase amlodipine to 10mg  HLD: continued off statin    in April 2022, almost at goal

## 2022-08-31 ENCOUNTER — OFFICE VISIT (OUTPATIENT)
Dept: SLEEP CENTER | Facility: CLINIC | Age: 42
End: 2022-08-31
Payer: COMMERCIAL

## 2022-08-31 VITALS
SYSTOLIC BLOOD PRESSURE: 116 MMHG | DIASTOLIC BLOOD PRESSURE: 70 MMHG | BODY MASS INDEX: 40.78 KG/M2 | WEIGHT: 216 LBS | HEIGHT: 61 IN | HEART RATE: 75 BPM

## 2022-08-31 DIAGNOSIS — R51.9 HEADACHE UPON AWAKENING: ICD-10-CM

## 2022-08-31 DIAGNOSIS — J35.1 TONSILLAR HYPERTROPHY: ICD-10-CM

## 2022-08-31 DIAGNOSIS — G47.09 OTHER INSOMNIA: ICD-10-CM

## 2022-08-31 DIAGNOSIS — E66.01 MORBID OBESITY (HCC): ICD-10-CM

## 2022-08-31 DIAGNOSIS — R53.83 FATIGUE, UNSPECIFIED TYPE: ICD-10-CM

## 2022-08-31 DIAGNOSIS — F45.8 BRUXISM: ICD-10-CM

## 2022-08-31 DIAGNOSIS — E66.01 MORBID (SEVERE) OBESITY DUE TO EXCESS CALORIES (HCC): ICD-10-CM

## 2022-08-31 DIAGNOSIS — G47.33 OSA (OBSTRUCTIVE SLEEP APNEA): Primary | ICD-10-CM

## 2022-08-31 DIAGNOSIS — I10 ESSENTIAL HYPERTENSION: ICD-10-CM

## 2022-08-31 DIAGNOSIS — G47.8 NON-RESTORATIVE SLEEP: ICD-10-CM

## 2022-08-31 PROCEDURE — 99244 OFF/OP CNSLTJ NEW/EST MOD 40: CPT | Performed by: INTERNAL MEDICINE

## 2022-08-31 RX ORDER — METOPROLOL SUCCINATE 50 MG/1
TABLET, EXTENDED RELEASE ORAL
COMMUNITY
Start: 2022-08-17

## 2022-08-31 RX ORDER — ZOLPIDEM TARTRATE 5 MG/1
5 TABLET ORAL AS NEEDED
Qty: 2 TABLET | Refills: 0 | Status: SHIPPED | OUTPATIENT
Start: 2022-08-31 | End: 2022-09-02

## 2022-08-31 NOTE — PROGRESS NOTES
Consultation - [facility]  35 y o  female  :1980  QVZ:[id number]  DOS:2022    Physician Requesting Consult: Florence Lopez MD             Reason for Consult : At your kind request I saw Zahraa Singleton for initial sleep evaluation today  The patient is planning Bariatric surgery and is here to also evaluate for Obstructive Sleep Apnea  PFSH, Problem List, Medications & Allergies were reviewed in EMR  Tory Yu  has a past medical history of Endometriosis and Hypertension  She has a current medication list which includes the following prescription(s): biotin, folic acid, ipratropium, magnesium, metoprolol succinate, omeprazole, rhubarb, zolpidem, amlodipine, vitamin d3, ciclopirox, fluticasone, ipratropium, and metoprolol succinate  HPI:  She volunteered no specific sleep complaints but does report difficulty initiating sleep that started around the time of menopause  She is not aware of snoring but  has noted "heavy breathing during sleep   Other Complaints:  Tired and pushing myself to get through the day  Restless Leg Syndrome: reports no suggestive symptoms  Parasomnia: reports teeth grinding during sleep;   Sleep Routine (on average):   Typical Bedtime:  9:30 p m  Gets OOB:  6:00 a m  TIB:8 5 hrs  Sleep latency:> 60 minutes because I am just not tired   She watches TV in bed  Sleep Interruptions:2-3/nite because of nocturia and able to fall back asleep  Awakens: needing an alarm  Upon awakening: never feels rested and awakens with headache 1 to 2 times a week  She estimates getting 6 hrs sleep  Tory Yu reports constant fatigue, no  Daytime Sleepiness but may nap on weekends    She rated herself at Total score: 2 /24 on the [place] Sleepiness Scale  Habits:  reports that she has never smoked   She has never used smokeless tobacco  ;   E-Cigarette/Vaping    E-Cigarette Use Never User     ;  reports no history of drug use ;  reports previous alcohol use  ; Caffeine use:limited ; Exercise routine: irregular   Family History:  Daughter had obstructive sleep apnea and needed adenotonsillectomy  ROS - reviewed and as attached  Significant for approximately 50 lb weight gain in the past 2 years  She reported no nasal, respiratory or cardiac symptoms  Raeann Jonny EXAM:  /70   Pulse 75   Ht 5' 1" (1 549 m)   Wt 98 kg (216 lb)   LMP 04/01/2013 (Approximate)   BMI 40 81 kg/m²    General: Well groomed female, well appearing, in no apparent distress  Neurological: Alert and orientated;  cooperative; Cranial nerves intact;    Psychiatric: Speech:clear and coherent;  Normal mood, affect & thought   Skin: warm and dry; Color& Hydration good; no facial rashes or lesions   HEENT:  Craniofacial anatomy: normal Sinuses: non- tender  TMJ: Normal    Eyes: EOM's intact;  conjunctiva/corneas clear   Ears: Externallyappear normal     Nasal Airway: is patent Septum:intact; Mucosa: normal; Turbinates: normal; Rhinorrhea: None   Mouth: Lips: normal posture; Dentition: normal   Mucosa:moist  ; Hard Palate:normal    Oropharryx: crowded and laterally narrowedTongue: Mallampati:Class IV, Mobile and ScallopedSoft Palate:  redundant  Tonsils: 2+   Neck:; Neck Circumference: 14 75 "; Supple; no abnormal masses; Thyroid:normal  Trachea:central     Lymph: No Cervical or Submandibular Lymhadenopathy  Heart: S1,S2 normal; RRR; no gallop; no murmurs   Lungs: Respiratory Effort:normal  Air entry good bilaterally  No wheezes  No rales  Abdomen: Obese, Soft & non-tender    Extremities: No pedal edema  No clubbing or cyanosis  Musculoskeletal:  Motor normal; Gait:normal        IMPRESSION: Primary/Secondary Sleep Diagnoses (to Medical or Psych conditions) & Comorbidities   1  YURIDIA (obstructive sleep apnea)  Diagnostic Sleep Study    CPAP Study   2  Other insomnia  zolpidem (AMBIEN) 5 mg tablet   3  Headache upon awakening     4  Non-restorative sleep     5  Bruxism     6   Fatigue, unspecified type     7  Tonsillar hypertrophy     8  Essential hypertension     9  Morbid obesity ([facility] [address] )  Ambulatory referral to Sleep Medicine   10  Morbid (severe) obesity due to excess calories [facility])  Ambulatory Referral to Sleep Medicine        PLAN:   With respect to above conditions, comprehensive counseling provided on pathophysiology; effects on symptoms and comorbidities, diagnostic strategies & limitations; treatment options; risks or no treament; risks & benefits of the various therapeutic options; costs and insurance aspects  I advised weight reduction, avoiding sleeping supine, using alcohol or sedating medications close to bed time and on safe driving practices  Nocturnal polysomnography is indicated and a diagnostic study will be scheduled  Patient is willing to try Positive airway pressure therapy and will be scheduled for a titration study if indicated  Multi component Cognitive behavioral therapy for Insomnia undertaken - Sleep Restriction, Stimulus control, Relaxation techniques and Sleep hygiene were discussed  Follow-up to be scheduled after the studies to review results, further details of treatment options and to initiate/adjust therapy  Sincerely,      Authenticated electronically on 26/80/52   Board Certified Specialist     Portions of the record may have been created with voice recognition software  Occasional wrong word or "sound a like" substitutions may have occurred due to the inherent limitations of voice recognition software  There may also be notations and random deletions of words or characters from malfunctioning software  Read the chart carefully and recognize, using context, where substitutions/deletions have occurred

## 2022-08-31 NOTE — PROGRESS NOTES
Review of Systems      Genitourinary need to urinate more than twice a night and hot flashes at night   Cardiology none   Gastrointestinal none   Neurology frequent headaches   Constitutional weight change   Integumentary none   Psychiatry none   Musculoskeletal back pain and leg cramps   Pulmonary none   ENT none   Endocrine frequent urination   Hematological none

## 2022-08-31 NOTE — PATIENT INSTRUCTIONS
What you can do to improve your sleep: (Sleep Hygiene) Basic rules for a good night's sleep    Create a regular sleep schedule  This will help you form a sleep routine  Keep a record of your sleep patterns, and any sleeping problems you have  Bring the record to follow-up visits with healthcare providers  Avoid prolonged use of light-emitting screens before bedtime or watching TV in bed  Avoid forcing sleep  Do not take naps  Naps could make it hard for you to fall asleep at bedtime  Deal with your worries before bedtime  Keep your bedroom cool, quiet, and dark  Turn on white noise, such as a fan, to help you relax  Do not use your bed for any activity that will keep you awake  Do not read, exercise, eat, or watch TV in your bedroom  Get up if you do not fall asleep within 20 minutes  Move to another room and do something relaxing until you become sleepy  Limit caffeine, alcohol, nicotine and food to earlier in the day  Only drink caffeine in the morning  Do not drink alcohol within 6 hours of bedtime  Do not eat a heavy meal right before you go to bed  Avoid smoking, especially in the evening  Exercise regularly  Daily exercise will help you sleep better  Do not exercise within 4 hours of bedtime  Stimulus control therapy rules  1  Go to bed only when sleepy  2  Do not watch television, read, eat, or worry while in bed  Use bed only for sleep and sex  3  Get out of bed if unable to fall asleep within 20 minutes and go to another room  Return to bed only when sleepy  Repeat this step as many times as necessary throughout the night  4  Set an alarm clock to wake up at a fixed time each morning, including weekends  5  Do not take a nap during the day  Data from: [address] [place] [address], [address] Nonpharmacologic treatments of insomnia  J Clin Psychiatry 3522; 53:37  Go to AASM website for more information: Sleepeducation  org     Recommended Reading:  Book by authors Yael Robb No More sleepless nights    What is YURIDIA? Obstructive sleep apnea is a common and serious sleep disorder that causes you to stop breathing during sleep  The airway repeatedly becomes blocked, limiting the amount of air that reaches your lungs  When this happens, you may snore loudly or making choking noises as you try to breathe  Your brain and body becomes oxygen deprived and you may wake up  This may happen a few times a night, or in more severe cases, several hundred times a night  Sleep apnea can make you wake up in the morning feeling tired or unrefreshed even though you have had a full night of sleep  During the day, you may feel fatigued, have difficulty concentrating or you may even unintentionally fall asleep  This is because your body is waking up numerous times throughout the night, even though you might not be conscious of each awakening  The lack of oxygen your body receives can have negative long-term consequences for your health  This includes:  High blood pressure  Heart disease  Irregular heart rhythms  Stroke  Pre-diabetes and diabetes  Depression    Testing  An objective evaluation of your sleep may be needed before your board certified sleep physician can make a diagnosis  Options include:   In-lab overnight sleep study  This type of sleep study requires you to stay overnight at a sleep center, in a bed that may resemble a hotel room  You will sleep with sensors hooked up to various parts of your body  These sensors record your brain waves, heartbeat, breathing and movement  An overnight sleep study also provides your doctor with the most complete information about your sleep  Learn more about an overnight sleep study       Home sleep apnea test  Some patients with high risk factors for obstructive sleep apnea and no other medical disorders may be candidates for a home sleep apnea test  The testing equipment differs in that it is less complicated than what is used in an overnight sleep study  As such, does not give all the data an in-lab will and if negative, may not mean you do not have the problem  Treatment for sleep apnea  includes using a continuous positive airway pressure (CPAP) machine to keep your airway open during sleep  A mask is placed over your nose and mouth, or just your nose  The mask is hooked to the CPAP machine that blows a gentle stream of air into the mask when you breathe  This helps keep your airway open so you can breathe more regularly  Extra oxygen may be given to you through the machine  You may be given a mouth device  It looks like a mouth guard or dental retainer and stops your tongue and mouth tissues from blocking your throat while you sleep  Surgery may be needed to remove extra tissues that block your mouth, throat, or nose  Manage sleep apnea:   Do not smoke  Nicotine and other chemicals in cigarettes and cigars can cause lung damage  Ask your healthcare provider for information if you currently smoke and need help to quit  E-cigarettes or smokeless tobacco still contain nicotine  Talk to your healthcare provider before you use these products  Do not drink alcohol or take sedative medicine before you go to sleep  Alcohol and sedatives can relax the muscles and tissues around your throat  This can block the airflow to your lungs  Maintain a healthy weight  Excess tissue around your throat may restrict your breathing  Ask your healthcare provider for information if you need to lose weight  Sleep on your side or use pillows designed to prevent sleep apnea  This prevents your tongue or other tissues from blocking your throat  You can also raise the head of your bed  Driving Safety  Refrain from driving when drowsy  Follow up with your healthcare provider as directed:  Write down your questions so you remember to ask them during your visits  Go to AASM website for more information: Sleepeducation  org     What is YURIDIA?    Obstructive sleep apnea is a common and serious sleep disorder that causes you to stop breathing during sleep  The airway repeatedly becomes blocked, limiting the amount of air that reaches your lungs  When this happens, you may snore loudly or making choking noises as you try to breathe  Your brain and body becomes oxygen deprived and you may wake up  This may happen a few times a night, or in more severe cases, several hundred times a night  Sleep apnea can make you wake up in the morning feeling tired or unrefreshed even though you have had a full night of sleep  During the day, you may feel fatigued, have difficulty concentrating or you may even unintentionally fall asleep  This is because your body is waking up numerous times throughout the night, even though you might not be conscious of each awakening  The lack of oxygen your body receives can have negative long-term consequences for your health  This includes:  High blood pressure  Heart disease  Irregular heart rhythms  Stroke  Pre-diabetes and diabetes  Depression    Testing  An objective evaluation of your sleep may be needed before your board certified sleep physician can make a diagnosis  Options include:   In-lab overnight sleep study  This type of sleep study requires you to stay overnight at a sleep center, in a bed that may resemble a hotel room  You will sleep with sensors hooked up to various parts of your body  These sensors record your brain waves, heartbeat, breathing and movement  An overnight sleep study also provides your doctor with the most complete information about your sleep  Learn more about an overnight sleep study  Home sleep apnea test  Some patients with high risk factors for obstructive sleep apnea and no other medical disorders may be candidates for a home sleep apnea test  The testing equipment differs in that it is less complicated than what is used in an overnight sleep study   As such, does not give all the data an in-lab will and if negative, may not mean you do not have the problem  Treatment for sleep apnea  includes using a continuous positive airway pressure (CPAP) machine to keep your airway open during sleep  A mask is placed over your nose and mouth, or just your nose  The mask is hooked to the CPAP machine that blows a gentle stream of air into the mask when you breathe  This helps keep your airway open so you can breathe more regularly  Extra oxygen may be given to you through the machine  You may be given a mouth device  It looks like a mouth guard or dental retainer and stops your tongue and mouth tissues from blocking your throat while you sleep  Surgery may be needed to remove extra tissues that block your mouth, throat, or nose  Manage sleep apnea:   Do not smoke  Nicotine and other chemicals in cigarettes and cigars can cause lung damage  Ask your healthcare provider for information if you currently smoke and need help to quit  E-cigarettes or smokeless tobacco still contain nicotine  Talk to your healthcare provider before you use these products  Do not drink alcohol or take sedative medicine before you go to sleep  Alcohol and sedatives can relax the muscles and tissues around your throat  This can block the airflow to your lungs  Maintain a healthy weight  Excess tissue around your throat may restrict your breathing  Ask your healthcare provider for information if you need to lose weight  Sleep on your side or use pillows designed to prevent sleep apnea  This prevents your tongue or other tissues from blocking your throat  You can also raise the head of your bed  Driving Safety  Refrain from driving when drowsy  Follow up with your healthcare provider as directed:  Write down your questions so you remember to ask them during your visits  Go to AAS website for more information: Sleepeducation  org       Nursing Support:  When: Monday through Friday 7A-5PM except holidays  Where: Our direct line is [phone]  If you are having a true emergency please call 911  In the event that the line is busy or it is after hours please leave a voice message and we will return your call  Please speak clearly, leaving your full name, birth date, best number to reach you and the reason for your call  Medication refills: We will need the name of the medication, the dosage, the ordering provider, whether you get a 30 or 90 day refill, and the pharmacy name and address  Medications will be ordered by the provider only  Nurses cannot call in prescriptions  Please allow 7 days for medication refills  Physician requested updates: If your provider requested that you call with an update after starting medication, please be ready to provide us the medication and dosage, what time you take your medication, the time you attempt to fall asleep, time you fall asleep, when you wake up, and what time you get out of bed  Sleep Study Results: We will contact you with sleep study results and/or next steps after the physician has reviewed your testing

## 2022-09-06 ENCOUNTER — HOSPITAL ENCOUNTER (OUTPATIENT)
Dept: SLEEP CENTER | Facility: CLINIC | Age: 42
Discharge: HOME | End: 2022-09-06
Payer: COMMERCIAL

## 2022-09-06 ENCOUNTER — TELEPHONE (OUTPATIENT)
Dept: SLEEP CENTER | Facility: CLINIC | Age: 42
End: 2022-09-06

## 2022-09-06 DIAGNOSIS — G47.33 OSA (OBSTRUCTIVE SLEEP APNEA): ICD-10-CM

## 2022-09-06 PROCEDURE — 95810 POLYSOM 6/> YRS 4/> PARAM: CPT

## 2022-09-07 NOTE — PROGRESS NOTES
Sleep Study Documentation    Pre-Sleep Study       Sleep testing procedure explained to patient:YES    Patient napped prior to study:NO    [address] [place] worker after 12PM   Caffeine use:NO    Alcohol:Dayshift workers after 5PM: Alcohol use:NO    Typical day for patient:YES       Study Documentation    Sleep Study Indications: snoring, insomnia, Interested in Bariatric srgery    Sleep Study: Diagnostic   Snore: Moderate  Supplemental O2: no    Minimum SaO2 88  Baseline SaO2 94            EKG abnormalities: no     EEG abnormalities: no    Sleep Study Recorded < 2 hours: N/A    Sleep Study Recorded > 2 hours but incomplete study: N/A    Sleep Study Recorded 6 hours but no sleep obtained: NO    Patient classification: employed       Post-Sleep Study    Medication used at bedtime or during sleep study:YES prescription sleep aid    Patient reports time it took to fall asleep:20 to 30 minutes    Patient reports waking up during study:Denied    Patient reports sleeping 4 to 6 hours without dreaming  Patient reports sleep during study:better than usual    Patient rated sleepiness: Not sleepy or tired    PAP treatment:no

## 2022-09-12 ENCOUNTER — TELEPHONE (OUTPATIENT)
Dept: SLEEP CENTER | Facility: CLINIC | Age: 42
End: 2022-09-12

## 2022-09-12 NOTE — TELEPHONE ENCOUNTER
Called patient  Advised diagnostic sleep study is resulted and shows mild/moderate YURIDIA (AHI7 4)  Patient scheduled for CPAP titration study 2/17/2023, and on cancellation list for sooner appointment date

## 2022-09-14 ENCOUNTER — OFFICE VISIT (OUTPATIENT)
Dept: BARIATRICS | Facility: CLINIC | Age: 42
End: 2022-09-14
Payer: COMMERCIAL

## 2022-09-14 VITALS
DIASTOLIC BLOOD PRESSURE: 80 MMHG | WEIGHT: 218 LBS | HEIGHT: 62 IN | SYSTOLIC BLOOD PRESSURE: 118 MMHG | BODY MASS INDEX: 40.12 KG/M2 | HEART RATE: 70 BPM | TEMPERATURE: 98.2 F

## 2022-09-14 DIAGNOSIS — E66.01 OBESITY, CLASS III, BMI 40-49.9 (MORBID OBESITY) (HCC): Primary | ICD-10-CM

## 2022-09-14 DIAGNOSIS — I10 ESSENTIAL HYPERTENSION: ICD-10-CM

## 2022-09-14 DIAGNOSIS — G47.33 OSA (OBSTRUCTIVE SLEEP APNEA): ICD-10-CM

## 2022-09-14 DIAGNOSIS — K29.51 CHRONIC GASTRITIS WITH BLEEDING, UNSPECIFIED GASTRITIS TYPE: ICD-10-CM

## 2022-09-14 DIAGNOSIS — E78.2 MIXED HYPERLIPIDEMIA: ICD-10-CM

## 2022-09-14 PROCEDURE — 99204 OFFICE O/P NEW MOD 45 MIN: CPT | Performed by: SURGERY

## 2022-09-14 NOTE — PROGRESS NOTES
BARIATRIC INITIAL CONSULT - BARIATRIC SURGERY    True Aviles 35 y o  female MRN: [id number]  Unit/Bed#:  Encounter: [id number]      HPI:  True Aviles is a 35 y o  female who presents with a longstanding history of morbid obesity and inability to sustain a meaningful weight loss  Here today to discuss bariatric options  Visit type: initial visit    Symptoms: excess weight and inability to loss weight    Associated Symptoms: depressed mood and anxiety    Associated Conditions: hyperlipidemia, sleep apnea and abdominal obesity  Disease Complications: hypertension and sleep apnea  Weight Loss Interest: high  Previous Diet Trials: low calorie     Exercise Frequency:infrequency  Types of Exercise: walking        Review of Systems   All other systems reviewed and are negative  Historical Information   Past Medical History:   Diagnosis Date    Endometriosis     Hypertension      Past Surgical History:   Procedure Laterality Date    HYSTERECTOMY  2014    OOPHORECTOMY Left 2014     Social History   Social History     Substance and Sexual Activity   Alcohol Use Not Currently     Social History     Substance and Sexual Activity   Drug Use Never     Social History     Tobacco Use   Smoking Status Never Smoker   Smokeless Tobacco Never Used     Family History: non-contributory    Meds/Allergies   all medications and allergies reviewed  Allergies   Allergen Reactions    Terbinafine Swelling     Tongue swelling; not 100% positive    Iodine - Food Allergy     Other      Seasonal, seafood    Shellfish-Derived Products - Food Allergy        Objective       Current Vitals:   Blood Pressure: 118/80 (09/14/22 1129)  Pulse: 70 (09/14/22 1129)  Temperature: 98 2 °F (36 8 °C) (09/14/22 1129)  Temp Source: Tympanic (09/14/22 1129)  Height: 5' 1 5" (156 2 cm) (09/14/22 1129)  Weight - Scale: 98 9 kg (218 lb) (09/14/22 1129)        Invasive Devices  Report    None                 Physical Exam  Vitals reviewed  Constitutional:       General: She is not in acute distress  Appearance: She is well-developed  She is not diaphoretic  HENT:      Head: Normocephalic and atraumatic  Right Ear: External ear normal       Left Ear: External ear normal       Nose: Nose normal    Eyes:      General: No scleral icterus  Right eye: No discharge  Left eye: No discharge  Conjunctiva/sclera: Conjunctivae normal    Neurological:      Mental Status: She is alert and oriented to person, place, and time  Psychiatric:         Behavior: Behavior normal          Thought Content: Thought content normal          Judgment: Judgment normal          Lab Results: I have personally reviewed pertinent lab results  Imaging: I have personally reviewed pertinent reports  EKG, Pathology, and Other Studies: I have personally reviewed pertinent reports  Assessment/PLAN:    35 y o  female with a long standing h/o of obesity and inability to sustain any meaningful weight loss on her own despite several attempts  She is interested in the Laparoscopic Gastric Bypass possible Sleeve gastrectomy  As a part of her pre op evaluation, she will be referred to a cardiologist and for a sleep evaluation and consult  She needs an EGD to evaluate the anatomy of her GI tract prior to the operation  I have spent over 30 minutes with her face to face in the office today discussing her options and details of the surgery  We have seen an animation of the surgery on the computer that illustrates how the operation is done and how the anatomy will be altered with the procedure  Over 50% of this was coordinating care  I have used the [facility] bariatric surgical risk/benefit calculator and we have discussed the results as part of the preop education  She was given the opportunity to ask questions and I have answered all of them    I have discussed and educated the patient with regards to the components of our multidisciplinary program and the importance of compliance and follow up in the post operative period  The patient was also instructed with regards to the importance of behavior modification, nutritional counseling, support meeting attendance and lifestyle changes that are important to ensure success  Although there is a great statistical chance of improvement or even resolution of most of her associated comorbidities, the results vary from patient to patient and they largely depend on her commitment and compliance  She needs to lose  14 lbs prior to the operation        Ramona Vidal MD  9/14/2022  11:40 AM

## 2022-10-24 ENCOUNTER — PREP FOR PROCEDURE (OUTPATIENT)
Dept: BARIATRICS | Facility: CLINIC | Age: 42
End: 2022-10-24

## 2022-10-24 DIAGNOSIS — Z98.84 BARIATRIC SURGERY STATUS: Primary | ICD-10-CM

## 2022-10-28 ENCOUNTER — OFFICE VISIT (OUTPATIENT)
Dept: BARIATRICS | Facility: CLINIC | Age: 42
End: 2022-10-28

## 2022-10-28 VITALS — WEIGHT: 219.5 LBS | HEIGHT: 62 IN | BODY MASS INDEX: 40.39 KG/M2

## 2022-10-28 DIAGNOSIS — E66.01 OBESITY, CLASS III, BMI 40-49.9 (MORBID OBESITY) (HCC): Primary | ICD-10-CM

## 2022-10-28 PROCEDURE — RECHECK: Performed by: DIETITIAN, REGISTERED

## 2022-10-28 NOTE — PROGRESS NOTES
Bariatric Nutrition Assessment Note    Type of surgery    Preop  Surgery Date: Pt has 6 month program requirement   Today is 3/6 of program requirement     Surgeon: Dr Sonali Horta  35 y o   female     Wt with BMI of 25: 132 4#  Pre-Op Excess Wt: 83 1#  LMP 04/01/2013 (Approximate)    Ht 5' 1 5" (1 562 m)   Wt 99 6 kg (219 lb 8 oz)   LMP 04/01/2013 (Approximate)   BMI 40 80 kg/m²      Pt has maintained her weight within 3# since starting the program     Wt Readings from Last 3 Encounters:   09/14/22 98 9 kg (218 lb)   08/31/22 98 kg (216 lb)   08/22/22 98 7 kg (217 lb 8 oz)       [place]  [place] Equation:  1578 kcal per day   Estimated calories for weight loss 893-1393 kcal per day  ( 1-2# per wk wt loss - sedentary )  Estimated protein needs 60-72 grams per day (1 0-1 2 gms/kg IBW )   Estimated fluid needs 70 ounces per day (-35 ml/kg IBW )      Weight History   Onset of Obesity: Adult- hysterectomy in 2014  Family history of obesity: No  Wt Loss Attempts: Commercial Programs ([employer], [employer], etc )  Counseling with  MD  High Protein/Low CHO diets (Atkins, Union, etc )  Meal Replacements (Medifast, Slim Fast, etc )  OTC meds/supplements, noon   Patient has tried the above for 6 months or more with insufficient weight loss or weight regain, which is why patient has requested to be evaluated for weight loss surgery today  Maximum Wt Lost: 5 pound weight loss in 6 weeks while on Lent and eliminating all meat       Review of History and Medications   Past Medical History:   Diagnosis Date   • Endometriosis    • Hypertension      Past Surgical History:   Procedure Laterality Date   • HYSTERECTOMY  2014   • OOPHORECTOMY Left 2014     Social History     Socioeconomic History   • Marital status: /Civil Union     Spouse name: Not on file   • Number of children: Not on file   • Years of education: Not on file   • Highest education level: Not on file Occupational History   • Not on file   Tobacco Use   • Smoking status: Never Smoker   • Smokeless tobacco: Never Used   Vaping Use   • Vaping Use: Never used   Substance and Sexual Activity   • Alcohol use: Not Currently   • Drug use: Never   • Sexual activity: Yes     Birth control/protection: Surgical   Other Topics Concern   • Not on file   Social History Narrative   • Not on file     Social Determinants of Health     Financial Resource Strain: Not on file   Food Insecurity: Not on file   Transportation Needs: Not on file   Physical Activity: Not on file   Stress: Not on file   Social Connections: Not on file   Intimate Partner Violence: Not on file   Housing Stability: Not on file       Current Outpatient Medications:   •  amLODIPine (NORVASC) 5 mg tablet, Take 5 mg by mouth daily, Disp: , Rfl:   •  Biotin 1 MG CAPS, Take 1 capsule by mouth every other day, Disp: , Rfl:   •  Cholecalciferol (Vitamin D3) 1 25 MG (69693 UT) CAPS, Take 1 capsule by mouth once a week, Disp: , Rfl:   •  ciclopirox (LOPROX) 0 77 % cream, APPLY TOPICALLY TO THE AFFECTED AREA TWICE DAILY  GENTLY MASSAGE INTO AFFECTED AREAS AND SURROUNDING SKIN, Disp: , Rfl:   •  fluticasone (FLONASE) 50 mcg/act nasal spray, 2 sprays into each nostril daily, Disp: , Rfl:   •  folic acid (FOLVITE) 175 MCG tablet, Take 800 mcg by mouth daily, Disp: , Rfl:   •  ipratropium (ATROVENT) 0 06 % nasal spray, 1-2 sprays into each nostril as needed in the morning and 1-2 sprays as needed at noon and 1-2 sprays as needed in the evening and 1-2 sprays as needed before bedtime  , Disp: , Rfl:   •  ipratropium (ATROVENT) 0 06 % nasal spray, USE 1 TO 2 SPRAYS IN EACH NOSTRIL FOUR TIMES DAILY AS NEEDED FOR RHINITIS, Disp: , Rfl:   •  MAGNESIUM PO, Take by mouth, Disp: , Rfl:   •  metoprolol succinate (TOPROL-XL) 25 mg 24 hr tablet, Take 25 mg by mouth daily, Disp: , Rfl:   •  metoprolol succinate (TOPROL-XL) 50 mg 24 hr tablet, , Disp: , Rfl:   •  omeprazole (PriLOSEC) 20 mg delayed release capsule, Take 1 capsule by mouth daily Every other day 4omg, Disp: , Rfl:   •  Rhubarb (ESTROVEN COMPLETE PO), Take by mouth, Disp: , Rfl:   •  zolpidem (AMBIEN) 5 mg tablet, Take 1 tablet (5 mg total) by mouth as needed for sleep (for use during sleep study) for up to 2 days, Disp: 2 tablet, Rfl: 0     Food Intake and Lifestyle Assessment   Food Intake Assessment completed via usual diet recall  Breakfast: Coffee with 1/2 and 1/2 or creamer ( may add 1 tsp creamer )   Snack: 0  Lunch: 11:30 am - three times per week   Slice of pizza with salad ( take out ) or will pack lunch with left overs   Or wrap with chicken salad , sometimes cubed cheese , pretzels   Sometimes chicken wings or salad or soup when  if off takes her out to eat   Snack: 0  Dinner: cooks dinner - 6:30 pm to 7:00 pm   Picks while cooking   Chicken breast, vegetables, and starch ( quinoa, parboiled rice, brown rice, ) or mashed potatoes beans or pasta ( whole wheat )   Snack: no   Beverage intake: coffee/tea- lots of water   Protein supplement: none currently   Estimated protein intake per day: ~30-40 grams   Estimated fluid intake per day: 80 ounces of fluid per day   Meals eaten away from home: once or twice per week   Typical meal pattern: 1-2 meals per day and 0 snacks per day  Eating Behaviors: Consumption of high calorie/ high fat foods  Food allergies or intolerances:    Allergies   Allergen Reactions   • Terbinafine Swelling     Tongue swelling; not 100% positive   • Iodine - Food Allergy    • Other      Seasonal, seafood   • Shellfish-Derived Products - Food Allergy      Had gallbladder out - so eggs and cheese cause diarrhea   Cultural or Church considerations: [employer] /      Physical Assessment  Physical Activity  Types of exercise: Walking  Current physical limitations: ankle pain from previous injury   SOB and heat intolerant     Psychosocial Assessment   Support systems: mother and fiancee Socioeconomic factors:oral surgeon full time     Nutrition Diagnosis  Diagnosis: Overweight / Obesity (NC-3 3)  Related to: Physical inactivity and Excessive energy intake  As Evidenced by: BMI >25 and Unintentional weight gain     Nutrition Prescription: Recommend the following diet  1400 kcal per day/ 90 grams protein/ 140 grams of carb/ 55 grams of fat, 15 grams of fiber  70 ounce or more of calorie free beverages  Interventions and Teaching   Discussed pre-op and post-op nutrition guidelines  Patient educated and handouts provided    Surgical changes to stomach / GI  Capacity of post-surgery stomach  Diet progression  Adequate hydration  Sugar and fat restriction to decrease "dumping syndrome"  Fat restriction to decrease steatorrhea  Expected weight loss  Weight loss plateaus/ possibility of weight regain  Exercise  Suggestions for pre-op diet  Nutrition considerations after surgery  Protein supplements  Meal planning and preparation  Appropriate carbohydrate, protein, and fat intake, and food/fluid choices to maximize safe weight loss, nutrient intake, and tolerance   Dietary and lifestyle changes  Possible problems with poor eating habits  Intuitive eating  Techniques for self monitoring and keeping daily food journal  Potential for food intolerance after surgery, and ways to deal with them including: lactose intolerance, nausea, reflux, vomiting, diarrhea, food intolerance, appetite changes, gas  Vitamin / Mineral supplementation of Multivitamin with minerals, Calcium, Vitamin B12, Iron, Fat Soluble vitamins and Vitamin D  Post-operative pregnancy guidelines- Pt had hysterectomy     Patient is not currently pregnant and doesn't desire to become pregnant a minimum of one year post-op    Education provided to: patient and mother present and supportive     Barriers to learning: No barriers identified    Readiness to change: preparation and action    Prior research on procedure: discussed with provider and pre-op class    Comprehension: needs reinforcement and verbalizes understanding     Expected Compliance: good    Workflow:  • Psych and/or D+A Clearance: n/a  • PCP Letter: 10/7/2022  • Support Group: no  • Surgeon Appt  done  • EGD 11/30/2022   • Cardiac Risk Assessment 8/22/2022  • Sleep Studies completed, waiting for machine   • Blood work ordered   • Nicotine test n/a  • 6 Month Pre-Operative Program: 3/6  • Weight Loss 5% goal 205#      Recommendations  Pt is an appropriate candidate for surgery  Yes    Evaluation / Monitoring  Dietitian to Monitor: Eating pattern as discussed Tolerance of nutrition prescription Body weight Lab values Physical activity Bowel pattern  Pt was at work last night for an emergency surgery, since 2 am   She has been consistently eating 3 meals per day  Made a point to eat breakfast before coming to her appointment today  She is taking her vitamin D3 ( 2000 IU) as recommended by her PCP and an adult multivitamin and mineral supplement  Her PCP also recommended collagen supplements for hair/ skin health   Her ankle injury is resolved and she is tracking her steps  Reviewed use of the WISETIVI laurie and provided with calorie and macro goals       Goals  Complete lession plans 1-6 and Eat 3 meals per day   Drink one shake and eat 2 meals or eat 3 meals per day  Track calories in baritastic 1400 calories per day/ 90 grams protein   Track steps 6000 or more per day     Time Spent:   30 minutes

## 2022-11-18 ENCOUNTER — HOSPITAL ENCOUNTER (OUTPATIENT)
Dept: SLEEP CENTER | Facility: CLINIC | Age: 42
Discharge: HOME | End: 2022-11-18

## 2022-11-18 DIAGNOSIS — G47.33 OSA (OBSTRUCTIVE SLEEP APNEA): ICD-10-CM

## 2022-11-19 NOTE — PROGRESS NOTES
Sleep Study Documentation    Pre-Sleep Study       Sleep testing procedure explained to patient:YES    Patient napped prior to study:YES- more than 30 minutes  Napped after 2PM: yes    Caffeine:Dayshift worker after 12PM   Caffeine use:YES- coffee  6 ounces    Alcohol:Dayshift workers after 5PM: Alcohol use:NO    Typical day for patient:YES       Study Documentation    Sleep Study Indications: YURIDIA    Sleep Study: Treatment   Optimal PAP pressure: 8cm  Leak:Small  Snore:Eliminated  REM Obtained:yes  Supplemental O2: no    Minimum SaO2 91%  Baseline SaO2 96%  PAP mask tried (list all)Medium Resmed [employer]itfit N20 nasal mask  PAP mask choice (final)Medium Resmed [employer] N20 nasal mask  PAP mask type:nasal  PAP pressure at which snoring was eliminated 8cm  Minimum SaO2 at final PAP pressure 92%  Mode of Therapy:CPAP  ETCO2:No  CPAP changed to BiPAP:No    Mode of Therapy:CPAP    EKG abnormalities: no     EEG abnormalities: no    Sleep Study Recorded < 2 hours: N/A    Sleep Study Recorded > 2 hours but incomplete study: N/A    Sleep Study Recorded 6 hours but no sleep obtained: NO    Patient classification: employed       Post-Sleep Study    Medication used at bedtime or during sleep study:YES other prescription medications    Patient reports time it took to fall asleep:30 to 60 minutes    Patient reports waking up during study:3 or more times  Patient reports returning to sleep in 10 to 30 minutes  Patient reports sleeping 4 to 6 hours with dreaming  Patient reports sleep during study:worse than usual    Patient rated sleepiness: Not sleepy or tired    PAP treatment:yes: Post PAP treatment patient reports feeling worse and  would wear PAP mask at home

## 2022-11-21 DIAGNOSIS — G47.33 OSA (OBSTRUCTIVE SLEEP APNEA): Primary | ICD-10-CM

## 2022-11-22 NOTE — PROGRESS NOTES
Behavioral Health Follow Up Note:       4 / 6  Weight Check:  Dr Blanquita Mackay     Starting weight 215 5  #  Today's weight 219 9  #  Surgery goal 205  #     Surgery month:  Jan/ Feb  Surgery deadline: May     Mental health and wellness - Son is following some of her changes  More mindful with her choices   "cleaning out the house" with certain foods  Does not go shopping when hungry  Slow cooking meals when work is long hours or early  Leaving notes on the fridge for the family  When stressed she does NOT eat  Son was in a car accident and this was reported to be very stressful       Eating behaviors -  eating breakfast now  Following the recommendations with food intake  Taking collagen powder  Feels "heavier"       Activity -   Walking for 30 minutes  Active on the weekends  stated walking has improved      Progress toward program requirements     Workflow:  • Psych and/or D+A Clearance: N/A  • PCP Letter:10/7/2022  • Support Group: pending - attended the [facility] group in error  Provided the surgical support  • Surgeon Appt : 9/14/2022  • EGD : 11/30/2022  • Cardiac Risk Assessment: 8/22/2022  • Sleep Studies: 8/31/2022  • Bloodwork: pending        Goals:  1  Increase cardio at the gym  2   More structure with her meals during the day  3   Pay attention to her sleeping hours/ habits    4   Candy jar at work and she visits often at work   (avoid)

## 2022-11-23 ENCOUNTER — OFFICE VISIT (OUTPATIENT)
Dept: BARIATRICS | Facility: CLINIC | Age: 42
End: 2022-11-23

## 2022-11-23 VITALS — WEIGHT: 219.9 LBS | BODY MASS INDEX: 40.88 KG/M2

## 2022-11-23 DIAGNOSIS — E66.01 OBESITY, CLASS III, BMI 40-49.9 (MORBID OBESITY) (HCC): Primary | ICD-10-CM

## 2022-11-25 ENCOUNTER — TELEPHONE (OUTPATIENT)
Dept: SLEEP CENTER | Facility: CLINIC | Age: 42
End: 2022-11-25

## 2022-11-25 LAB

## 2022-11-25 NOTE — TELEPHONE ENCOUNTER
Called patient and advised CPAP study resulted and CPAP ordered  Scheduled DME set up 12/13/22 in [facility] for CPAP and clinical information sent to [address] [place] [address] via [employer]  Scheduled compliance follow up 3/13/22    Added to wait list

## 2022-11-29 RX ORDER — SODIUM CHLORIDE 9 MG/ML
125 INJECTION, SOLUTION INTRAVENOUS CONTINUOUS
Status: CANCELLED | OUTPATIENT
Start: 2022-11-29

## 2022-11-29 RX ORDER — PROMETHAZINE HYDROCHLORIDE 25 MG/ML
12.5 INJECTION, SOLUTION INTRAMUSCULAR; INTRAVENOUS ONCE AS NEEDED
Status: CANCELLED | OUTPATIENT
Start: 2022-11-29

## 2022-11-29 RX ORDER — ALBUTEROL SULFATE 2.5 MG/3ML
2.5 SOLUTION RESPIRATORY (INHALATION) ONCE AS NEEDED
Status: CANCELLED | OUTPATIENT
Start: 2022-11-29

## 2022-11-29 RX ORDER — ONDANSETRON 2 MG/ML
4 INJECTION INTRAMUSCULAR; INTRAVENOUS ONCE AS NEEDED
Status: CANCELLED | OUTPATIENT
Start: 2022-11-29

## 2022-11-30 ENCOUNTER — ANESTHESIA (OUTPATIENT)
Dept: GASTROENTEROLOGY | Facility: HOSPITAL | Age: 42
End: 2022-11-30

## 2022-11-30 ENCOUNTER — HOSPITAL ENCOUNTER (OUTPATIENT)
Dept: GASTROENTEROLOGY | Facility: HOSPITAL | Age: 42
Setting detail: OUTPATIENT SURGERY
Discharge: HOME | End: 2022-11-30
Attending: SURGERY

## 2022-11-30 ENCOUNTER — ANESTHESIA EVENT (OUTPATIENT)
Dept: GASTROENTEROLOGY | Facility: HOSPITAL | Age: 42
End: 2022-11-30

## 2022-11-30 VITALS
HEIGHT: 62 IN | TEMPERATURE: 98.3 F | SYSTOLIC BLOOD PRESSURE: 92 MMHG | RESPIRATION RATE: 20 BRPM | DIASTOLIC BLOOD PRESSURE: 55 MMHG | HEART RATE: 64 BPM | OXYGEN SATURATION: 99 % | WEIGHT: 219 LBS | BODY MASS INDEX: 40.3 KG/M2

## 2022-11-30 DIAGNOSIS — Z98.84 BARIATRIC SURGERY STATUS: ICD-10-CM

## 2022-11-30 LAB

## 2022-11-30 RX ORDER — LIDOCAINE HYDROCHLORIDE 10 MG/ML
INJECTION, SOLUTION EPIDURAL; INFILTRATION; INTRACAUDAL; PERINEURAL AS NEEDED
Status: DISCONTINUED | OUTPATIENT
Start: 2022-11-30 | End: 2022-11-30

## 2022-11-30 RX ORDER — PROPOFOL 10 MG/ML
INJECTION, EMULSION INTRAVENOUS AS NEEDED
Status: DISCONTINUED | OUTPATIENT
Start: 2022-11-30 | End: 2022-11-30

## 2022-11-30 RX ORDER — SODIUM CHLORIDE 9 MG/ML
125 INJECTION, SOLUTION INTRAVENOUS CONTINUOUS
Status: DISCONTINUED | OUTPATIENT
Start: 2022-11-30 | End: 2022-12-04 | Stop reason: HOSPADM

## 2022-11-30 RX ADMIN — PROPOFOL 80 MG: 10 INJECTION, EMULSION INTRAVENOUS at 11:54

## 2022-11-30 RX ADMIN — SODIUM CHLORIDE 125 ML/HR: 0.9 INJECTION, SOLUTION INTRAVENOUS at 11:33

## 2022-11-30 RX ADMIN — LIDOCAINE HYDROCHLORIDE 50 MG: 10 INJECTION, SOLUTION EPIDURAL; INFILTRATION; INTRACAUDAL; PERINEURAL at 11:52

## 2022-11-30 RX ADMIN — PROPOFOL 200 MG: 10 INJECTION, EMULSION INTRAVENOUS at 11:52

## 2022-11-30 NOTE — ANESTHESIA POSTPROCEDURE EVALUATION
Post-Op Assessment Note    CV Status:  Stable    Pain management: adequate     Mental Status:  Alert and awake   Hydration Status:  Euvolemic   PONV Controlled:  Controlled   Airway Patency:  Patent      Post Op Vitals Reviewed: Yes      Staff: Anesthesiologist, CRNA         No notable events documented      BP      Temp      Pulse     Resp      SpO2      BP 92/55   Pulse 64   Temp 98 3 °F (36 8 °C) (Temporal)   Resp 20   Ht 5' 1 5" (1 562 m)   Wt 99 3 kg (219 lb)   LMP 04/01/2013 (Approximate)   SpO2 99%   BMI 40 71 kg/m²

## 2022-11-30 NOTE — H&P
This is a 38 y o  female with a history of morbid obesity and Body mass index is 40 71 kg/m²  Here for an EGD to evaluate the anatomy of the GI tract  Physical Exam    /70   Pulse 73   Temp 98 3 °F (36 8 °C) (Temporal)   Resp 16   Ht 5' 1 5" (1 562 m)   Wt 99 3 kg (219 lb)   LMP 04/01/2013 (Approximate)   SpO2 98%   BMI 40 71 kg/m²    AAOx3  RRR  CTA B  Abdomen obese  Benign  A/P:    This is a 38 y o  female with a history of morbid obesity and Body mass index is 40 71 kg/m²       Will proceed with the EGD and biopsies        Becky Quiroz MD  11/30/22  11:46 AM

## 2022-11-30 NOTE — ANESTHESIA PREPROCEDURE EVALUATION
Procedure:  EGD    Relevant Problems   CARDIO   (+) Essential hypertension   (+) Mixed hyperlipidemia      PULMONARY   (+) YURIDIA (obstructive sleep apnea)      Digestive   (+) Chronic gastritis with bleeding      Other   (+) Obesity, Class III, BMI 40-49 9 (morbid obesity) ([facility] [address] )        Physical Exam    Airway    Mallampati score: II  TM Distance: >3 FB  Neck ROM: full     Dental   No notable dental hx     Cardiovascular  Rhythm: regular, Rate: normal, Cardiovascular exam normal    Pulmonary  Pulmonary exam normal Breath sounds clear to auscultation,     Other Findings        Anesthesia Plan  ASA Score- 3     Anesthesia Type- IV sedation with anesthesia with ASA Monitors  Additional Monitors:   Airway Plan:     Comment: GA prn  Plan Factors-    Chart reviewed  Patient summary reviewed  Patient is not a current smoker  Patient not instructed to abstain from smoking on day of procedure  Patient did not smoke on day of surgery  Induction- intravenous  Postoperative Plan-     Informed Consent- Anesthetic plan and risks discussed with patient and spouse Heather Doyle)  I personally reviewed this patient with the CRNA  Discussed and agreed on the Anesthesia Plan with the CRNA  Brett Zhang

## 2022-12-09 LAB

## 2022-12-14 ENCOUNTER — TELEPHONE (OUTPATIENT)
Dept: SLEEP CENTER | Facility: CLINIC | Age: 42
End: 2022-12-14

## 2022-12-20 LAB
DME PARACHUTE DELIVERY DATE ACTUAL: NORMAL
DME PARACHUTE DELIVERY DATE REQUESTED: NORMAL
DME PARACHUTE DELIVERY NOTE: NORMAL
DME PARACHUTE ITEM DESCRIPTION: NORMAL
DME PARACHUTE ORDER STATUS: NORMAL
DME PARACHUTE SUPPLIER NAME: NORMAL
DME PARACHUTE SUPPLIER PHONE: NORMAL

## 2022-12-21 ENCOUNTER — TELEPHONE (OUTPATIENT)
Dept: SLEEP CENTER | Facility: CLINIC | Age: 42
End: 2022-12-21

## 2022-12-21 NOTE — TELEPHONE ENCOUNTER
Patient called and stated that she is having issues with her pressure of her CPAP machine  Patient is unable to tolerate the machine for the time that is required  She would like a phone call back at [phone]

## 2022-12-22 NOTE — PROGRESS NOTES
Bariatric Nutrition Assessment Note    Type of surgery    Preop  Surgery Date: Pt has 6 month program requirement   Today is 5/6 of program requirement     Surgeon: Dr Yessenia Darby  38 y o   female     Wt with BMI of 25: 132 4#  Pre-Op Excess Wt: 83 1#  LMP 04/01/2013 (Approximate)     Ht 5' 1 5" (1 562 m)   Wt 99 8 kg (220 lb 1 6 oz)   LMP 04/01/2013 (Approximate)   BMI 40 91 kg/m²    Maintained weight within 1# since last appointment     Pt has maintained her weight within 3# since starting the program     Wt Readings from Last 3 Encounters:   11/30/22 99 3 kg (219 lb)   11/23/22 99 7 kg (219 lb 14 4 oz)   10/28/22 99 6 kg (219 lb 8 oz)       Joe Clark Equation:  1578 kcal per day   Estimated calories for weight loss 893-1393 kcal per day  ( 1-2# per wk wt loss - sedentary )  Estimated protein needs 60-72 grams per day (1 0-1 2 gms/kg IBW )   Estimated fluid needs 70 ounces per day (-35 ml/kg IBW )      Weight History   Onset of Obesity: Adult- hysterectomy in 2014  Family history of obesity: No  Wt Loss Attempts: Commercial Programs ([employer], [employer], etc )  Counseling with  MD  High Protein/Low CHO diets (Atkins, Union, etc )  Meal Replacements (Medifast, Slim Fast, etc )  OTC meds/supplements, noon   Patient has tried the above for 6 months or more with insufficient weight loss or weight regain, which is why patient has requested to be evaluated for weight loss surgery today  Maximum Wt Lost: 5 pound weight loss in 6 weeks while on Lent and eliminating all meat       Review of History and Medications   Past Medical History:   Diagnosis Date   • Endometriosis      Past Surgical History:   Procedure Laterality Date   • HYSTERECTOMY  2014   • OOPHORECTOMY Left 2014     Social History     Socioeconomic History   • Marital status: /Civil Union     Spouse name: Not on file   • Number of children: Not on file   • Years of education: Not on file   • Highest education level: Not on file   Occupational History   • Not on file   Tobacco Use   • Smoking status: Never   • Smokeless tobacco: Never   Vaping Use   • Vaping Use: Never used   Substance and Sexual Activity   • Alcohol use: Yes     Comment: social   • Drug use: Never   • Sexual activity: Yes     Birth control/protection: Surgical   Other Topics Concern   • Not on file   Social History Narrative   • Not on file     Social Determinants of Health     Financial Resource Strain: Not on file   Food Insecurity: Not on file   Transportation Needs: Not on file   Physical Activity: Not on file   Stress: Not on file   Social Connections: Not on file   Intimate Partner Violence: Not on file   Housing Stability: Not on file       Current Outpatient Medications:   •  amLODIPine (NORVASC) 5 mg tablet, Take 5 mg by mouth daily, Disp: , Rfl:   •  Biotin 1 MG CAPS, Take 1 capsule by mouth every other day, Disp: , Rfl:   •  Cholecalciferol (Vitamin D3) 1 25 MG (72472 UT) CAPS, Take 1 capsule by mouth once a week, Disp: , Rfl:   •  ciclopirox (LOPROX) 0 77 % cream, APPLY TOPICALLY TO THE AFFECTED AREA TWICE DAILY  GENTLY MASSAGE INTO AFFECTED AREAS AND SURROUNDING SKIN, Disp: , Rfl:   •  fluticasone (FLONASE) 50 mcg/act nasal spray, 2 sprays into each nostril daily, Disp: , Rfl:   •  folic acid (FOLVITE) 443 MCG tablet, Take 800 mcg by mouth daily, Disp: , Rfl:   •  ipratropium (ATROVENT) 0 06 % nasal spray, 1-2 sprays into each nostril as needed in the morning and 1-2 sprays as needed at noon and 1-2 sprays as needed in the evening and 1-2 sprays as needed before bedtime  , Disp: , Rfl:   •  ipratropium (ATROVENT) 0 06 % nasal spray, USE 1 TO 2 SPRAYS IN EACH NOSTRIL FOUR TIMES DAILY AS NEEDED FOR RHINITIS, Disp: , Rfl:   •  MAGNESIUM PO, Take by mouth, Disp: , Rfl:   •  metoprolol succinate (TOPROL-XL) 25 mg 24 hr tablet, Take 25 mg by mouth daily, Disp: , Rfl:   •  omeprazole (PriLOSEC) 20 mg delayed release capsule, Take 1 capsule by mouth daily Every other day 4omg, Disp: , Rfl:   •  Rhubarb (ESTROVEN COMPLETE PO), Take by mouth, Disp: , Rfl:      Food Intake and Lifestyle Assessment   Food Intake Assessment completed via usual diet recall  Breakfast: Coffee with 1/2 and 1/2 or creamer ( may add 1 tsp creamer )   Snack: 0  Lunch: 11:30 am - three times per week   Slice of pizza with salad ( take out ) or will pack lunch with left overs   Or wrap with chicken salad , sometimes cubed cheese , pretzels   Sometimes chicken wings or salad or soup when  if off takes her out to eat   Snack: 0  Dinner: cooks dinner - 6:30 pm to 7:00 pm   Picks while cooking   Chicken breast, vegetables, and starch ( quinoa, parboiled rice, brown rice, ) or mashed potatoes beans or pasta ( whole wheat )   Snack: no   Beverage intake: coffee/tea- lots of water   Protein supplement: none currently   Estimated protein intake per day: ~30-40 grams   Estimated fluid intake per day: 80 ounces of fluid per day   Meals eaten away from home: once or twice per week   Typical meal pattern: 1-2 meals per day and 0 snacks per day  Eating Behaviors: Consumption of high calorie/ high fat foods  Food allergies or intolerances:    Allergies   Allergen Reactions   • Terbinafine Swelling     Tongue swelling; not 100% positive   • Iodine - Food Allergy    • Other      Seasonal, seafood   • Shellfish-Derived Products - Food Allergy      Had gallbladder out - so eggs and cheese cause diarrhea   Cultural or Hindu considerations: Sikhism /      Physical Assessment  Physical Activity  Types of exercise: Walking  Current physical limitations: ankle pain from previous injury   SOB and heat intolerant     Psychosocial Assessment   Support systems: mother and fiancee   Socioeconomic factors:oral surgeon full time     Nutrition Diagnosis  Diagnosis: Overweight / Obesity (NC-3 3)  Related to: Physical inactivity and Excessive energy intake  As Evidenced by: BMI >25 and Unintentional weight gain     Nutrition Prescription: Recommend the following diet  1400 kcal per day/ 90 grams protein/ 140 grams of carb/ 55 grams of fat, 15 grams of fiber  70 ounce or more of calorie free beverages  Interventions and Teaching   Discussed pre-op and post-op nutrition guidelines  Patient educated and handouts provided    Surgical changes to stomach / GI  Capacity of post-surgery stomach  Diet progression  Adequate hydration  Sugar and fat restriction to decrease "dumping syndrome"  Fat restriction to decrease steatorrhea  Expected weight loss  Weight loss plateaus/ possibility of weight regain  Exercise  Suggestions for pre-op diet  Nutrition considerations after surgery  Protein supplements  Meal planning and preparation  Appropriate carbohydrate, protein, and fat intake, and food/fluid choices to maximize safe weight loss, nutrient intake, and tolerance   Dietary and lifestyle changes  Possible problems with poor eating habits  Intuitive eating  Techniques for self monitoring and keeping daily food journal  Potential for food intolerance after surgery, and ways to deal with them including: lactose intolerance, nausea, reflux, vomiting, diarrhea, food intolerance, appetite changes, gas  Vitamin / Mineral supplementation of Multivitamin with minerals, Calcium, Vitamin B12, Iron, Fat Soluble vitamins and Vitamin D  Post-operative pregnancy guidelines- Pt had hysterectomy     Patient is not currently pregnant and doesn't desire to become pregnant a minimum of one year post-op    Education provided to: patient and mother present and supportive     Barriers to learning: No barriers identified    Readiness to change: preparation and action    Prior research on procedure: discussed with provider and pre-op class    Comprehension: needs reinforcement and verbalizes understanding     Expected Compliance: good    Workflow:  • Psych and/or D+A Clearance: n/a  • PCP Letter: 10/7/2022  • Support Group:done   • Surgeon Appt  done  • EGD 11/30/2022   • Cardiac Risk Assessment 8/22/2022  • Sleep Studies completed, has cpap machine   • Blood work ordered   • Nicotine test n/a  • 6 Month Pre-Operative Program: 5/6  • Weight Loss 5% goal 205#      Recommendations  Pt is an appropriate candidate for surgery  Yes    Evaluation / Monitoring  Dietitian to Monitor: Eating pattern as discussed Tolerance of nutrition prescription Body weight Lab values Physical activity Bowel pattern  Pt has been having difficulty tolerating her cpap   She has been trying to get in touch with the sleep center as she feels the pressure is too hard  She has been consistently eating 3 meals per day or eating 2 meals and drinking one shake  [place]onia Caller She wears her watch but does not always track her steps  She has started practicing the 30/60 rule, taking sips with meals  Overall, making lifestyle changes in preparation for surgery        Goals- continued   Complete lession plans 1-6 and Eat 3 meals per day   Drink one shake and eat 2 meals or eat 3 meals per day  Track steps 6000 or more per day   Practice 30/60 rule     Time Spent:   30 minutes

## 2022-12-23 ENCOUNTER — OFFICE VISIT (OUTPATIENT)
Dept: BARIATRICS | Facility: CLINIC | Age: 42
End: 2022-12-23

## 2022-12-23 VITALS — BODY MASS INDEX: 40.5 KG/M2 | WEIGHT: 220.1 LBS | HEIGHT: 62 IN

## 2022-12-23 DIAGNOSIS — Z01.818 PREOPERATIVE CLEARANCE: Primary | ICD-10-CM

## 2022-12-23 DIAGNOSIS — E78.2 MIXED HYPERLIPIDEMIA: ICD-10-CM

## 2022-12-23 DIAGNOSIS — E66.01 OBESITY, CLASS III, BMI 40-49.9 (MORBID OBESITY) (HCC): ICD-10-CM

## 2022-12-23 DIAGNOSIS — I10 ESSENTIAL HYPERTENSION: ICD-10-CM

## 2022-12-23 DIAGNOSIS — G47.33 OSA (OBSTRUCTIVE SLEEP APNEA): ICD-10-CM

## 2022-12-23 NOTE — TELEPHONE ENCOUNTER
[facility] and will be scanned to media when available  Call placed to patient  Patient having a difficult time using machine, wakes to feel like she is suffocating  Patient also reports that the machine is causing her entire face to be covered in water after use  Patient has attempted to use 12/19/22 through 12/21/22 but slept poorly, did not use 12/22/22 and slept well  Advised patient that compliance has been requested  Advised to try to use CPAP while awake to acclimate to pressures  Advised to check humidification setting for moisture issue  Also advised that it may be necessary to schedule an appointment with the DME provider to retreive compliance, review settings and verify device is functioning properly  Patient verbalized undertanding

## 2022-12-27 NOTE — TELEPHONE ENCOUNTER
Compliance received and scanned to media, very limited data available as reported by patient  Call placed to patient to follow-up regarding CPAP issues and to inquire if patient requires further assistance and [facility] instructions for [facility] device  Left message to contact the nursing staff

## 2022-12-28 NOTE — TELEPHONE ENCOUNTER
Returned call to patient  Patient still experiencing pressure and humidity issues while using CPAP  Patient has been trying to acclimate by using while awake  Patient would like to schedule an appointment with [facility] to verify CPAP settings and mask fit are correct  Patient unable to schedule appointment at this time, will call office to schedule after the holiday

## 2023-01-02 ENCOUNTER — OFFICE VISIT (OUTPATIENT)
Dept: LAB | Facility: CLINIC | Age: 43
End: 2023-01-02

## 2023-01-02 ENCOUNTER — APPOINTMENT (OUTPATIENT)
Dept: LAB | Facility: CLINIC | Age: 43
End: 2023-01-02

## 2023-01-02 DIAGNOSIS — G47.33 OSA (OBSTRUCTIVE SLEEP APNEA): ICD-10-CM

## 2023-01-02 DIAGNOSIS — E66.01 OBESITY, CLASS III, BMI 40-49.9 (MORBID OBESITY) (HCC): ICD-10-CM

## 2023-01-02 DIAGNOSIS — Z01.818 PRE-OPERATIVE CLEARANCE: ICD-10-CM

## 2023-01-02 DIAGNOSIS — I10 ESSENTIAL HYPERTENSION: ICD-10-CM

## 2023-01-02 DIAGNOSIS — Z01.818 PREOPERATIVE CLEARANCE: ICD-10-CM

## 2023-01-02 DIAGNOSIS — E78.2 MIXED HYPERLIPIDEMIA: ICD-10-CM

## 2023-01-02 LAB
ALBUMIN SERPL BCP-MCNC: 3.5 G/DL (ref 3.5–5)
ALP SERPL-CCNC: 65 U/L (ref 46–116)
ALT SERPL W P-5'-P-CCNC: 90 U/L (ref 12–78)
ANION GAP SERPL CALCULATED.3IONS-SCNC: 4 MMOL/L (ref 4–13)
AST SERPL W P-5'-P-CCNC: 37 U/L (ref 5–45)
BILIRUB SERPL-MCNC: 0.39 MG/DL (ref 0.2–1)
BUN SERPL-MCNC: 8 MG/DL (ref 5–25)
CALCIUM SERPL-MCNC: 8.8 MG/DL (ref 8.3–10.1)
CHLORIDE SERPL-SCNC: 104 MMOL/L (ref 96–108)
CO2 SERPL-SCNC: 29 MMOL/L (ref 21–32)
CREAT SERPL-MCNC: 0.74 MG/DL (ref 0.6–1.3)
ERYTHROCYTE [DISTWIDTH] IN BLOOD BY AUTOMATED COUNT: 11.7 % (ref 11.6–15.1)
GFR SERPL CREATININE-BSD FRML MDRD: 98 ML/MIN/1.73SQ M
GLUCOSE P FAST SERPL-MCNC: 94 MG/DL (ref 65–99)
HCT VFR BLD AUTO: 41.4 % (ref 34.8–46.1)
HGB BLD-MCNC: 13.1 G/DL (ref 11.5–15.4)
MCH RBC QN AUTO: 30 PG (ref 26.8–34.3)
MCHC RBC AUTO-ENTMCNC: 31.6 G/DL (ref 31.4–37.4)
MCV RBC AUTO: 95 FL (ref 82–98)
PLATELET # BLD AUTO: 383 THOUSANDS/UL (ref 149–390)
PMV BLD AUTO: 9 FL (ref 8.9–12.7)
POTASSIUM SERPL-SCNC: 4.3 MMOL/L (ref 3.5–5.3)
PROT SERPL-MCNC: 7.3 G/DL (ref 6.4–8.4)
RBC # BLD AUTO: 4.36 MILLION/UL (ref 3.81–5.12)
SODIUM SERPL-SCNC: 137 MMOL/L (ref 135–147)
WBC # BLD AUTO: 8.18 THOUSAND/UL (ref 4.31–10.16)

## 2023-01-03 LAB
ATRIAL RATE: 66 BPM
P AXIS: 32 DEGREES
PR INTERVAL: 152 MS
QRS AXIS: 29 DEGREES
QRSD INTERVAL: 82 MS
QT INTERVAL: 408 MS
QTC INTERVAL: 427 MS
T WAVE AXIS: 15 DEGREES
VENTRICULAR RATE: 66 BPM

## 2023-01-12 ENCOUNTER — TELEPHONE (OUTPATIENT)
Dept: BARIATRICS | Facility: CLINIC | Age: 43
End: 2023-01-12

## 2023-01-12 NOTE — TELEPHONE ENCOUNTER
----- Message from Calvin Ritter sent at 1/12/2023 11:44 AM EST -----  Please call patient to let her know her preop labs are within acceptable limits       Thank you,   VALDO Curry

## 2023-01-17 NOTE — PROGRESS NOTES
Behavioral Health Follow Up Note:       6 / 6  Weight Check:  Dr Isidro Rosenberg  (what surgery: sleeve)      Starting weight 215 5  #  Today's weight 220 5  #  Surgery goal 205  #     Surgery month:  Jan/ Feb  Surgery deadline: May     Mental health and wellness - Stated the six months has been a challenge  Working a lot of hours  Some family issues  Job is stressful and she is considering a new job where she has more "me time"  Learning how to say "no" to find time for herself  Going on vacation on Monday  Is moving into a new apartment that has more space       Eating behaviors -  Staying hydrated  Feels she could improve      Activity -   Walking for 30 minutes in nice weather  [place] Dapper on the weekends  stated walking has improved  Purchased hand weights      Progress toward program requirements     Workflow:  • Psych and/or D+A Clearance: N/A  • PCP Letter:10/7/2022  • Support Group: 12/19/2022  • Surgeon Appt : 9/14/2022  • EGD : 11/30/2022  CPAP  (picked up around Nov/Dec and is using  )  • Cardiac Risk Assessment: 8/22/2022 : updated 1/2/2023  • Sleep Studies: 8/31/2022  • Bloodwork:1/2/2023        Goals:  1   follow [employer] workout videos  Use arm weights

## 2023-01-20 ENCOUNTER — OFFICE VISIT (OUTPATIENT)
Dept: BARIATRICS | Facility: CLINIC | Age: 43
End: 2023-01-20

## 2023-01-20 VITALS — BODY MASS INDEX: 40.99 KG/M2 | WEIGHT: 220.5 LBS

## 2023-01-20 DIAGNOSIS — E66.01 OBESITY, CLASS III, BMI 40-49.9 (MORBID OBESITY) (HCC): Primary | ICD-10-CM

## 2023-01-24 ENCOUNTER — DOCUMENTATION (OUTPATIENT)
Dept: CARDIOLOGY CLINIC | Facility: MEDICAL CENTER | Age: 43
End: 2023-01-24

## 2023-01-27 ENCOUNTER — ANNUAL EXAM (OUTPATIENT)
Dept: GYNECOLOGY | Facility: CLINIC | Age: 43
End: 2023-01-27

## 2023-01-27 ENCOUNTER — TELEPHONE (OUTPATIENT)
Dept: BARIATRICS | Facility: CLINIC | Age: 43
End: 2023-01-27

## 2023-01-27 ENCOUNTER — PREP FOR PROCEDURE (OUTPATIENT)
Dept: BARIATRICS | Facility: CLINIC | Age: 43
End: 2023-01-27

## 2023-01-27 VITALS
DIASTOLIC BLOOD PRESSURE: 80 MMHG | HEIGHT: 62 IN | BODY MASS INDEX: 40.3 KG/M2 | SYSTOLIC BLOOD PRESSURE: 122 MMHG | WEIGHT: 219 LBS

## 2023-01-27 DIAGNOSIS — Z12.31 SCREENING MAMMOGRAM FOR BREAST CANCER: ICD-10-CM

## 2023-01-27 DIAGNOSIS — I10 ESSENTIAL HYPERTENSION, BENIGN: ICD-10-CM

## 2023-01-27 DIAGNOSIS — Z01.419 ENCOUNTER FOR WELL WOMAN EXAM: Primary | ICD-10-CM

## 2023-01-27 DIAGNOSIS — Z99.89 OSA ON CPAP: ICD-10-CM

## 2023-01-27 DIAGNOSIS — E78.5 HYPERLIPIDEMIA, UNSPECIFIED HYPERLIPIDEMIA TYPE: ICD-10-CM

## 2023-01-27 DIAGNOSIS — Z12.39 ENCOUNTER FOR SCREENING BREAST EXAMINATION: ICD-10-CM

## 2023-01-27 DIAGNOSIS — G47.33 OSA ON CPAP: ICD-10-CM

## 2023-01-27 DIAGNOSIS — E66.01 MORBID OBESITY (HCC): Primary | ICD-10-CM

## 2023-01-27 NOTE — PROGRESS NOTES
Assessment/Plan:    No problem-specific Assessment & Plan notes found for this encounter  Diagnoses and all orders for this visit:    Encounter for well woman exam    Encounter for screening breast examination    Screening mammogram for breast cancer  -     Mammo screening bilateral w 3d & cad; Future          Subjective:      Patient ID: Vish Brown is a 38 y o  female  Pt presents for her annual exam today--  She has no gyn complaints  She has no bleeding or pelvic pain--hyster  Bowel and bladder are regular  Colonoscopy--  No breast concerns today  Last mammo--4/22--asymmetry    No pap today  rx mammo/add views  Daily mvi      The following portions of the patient's history were reviewed and updated as appropriate: allergies, current medications, past family history, past medical history, past social history, past surgical history and problem list     Review of Systems   Constitutional: Negative for chills, fever and unexpected weight change  Gastrointestinal: Negative for abdominal pain, blood in stool, constipation and diarrhea  Genitourinary: Negative  Objective:      /80   Ht 5' 2" (1 575 m)   Wt 99 3 kg (219 lb)   LMP 04/01/2013 (Approximate)   BMI 40 06 kg/m²          Physical Exam  Vitals and nursing note reviewed  Constitutional:       Appearance: She is well-developed  HENT:      Head: Normocephalic and atraumatic  Chest:   Breasts:     Right: No inverted nipple, mass, nipple discharge or skin change  Left: No inverted nipple, mass, nipple discharge or skin change  Abdominal:      Palpations: Abdomen is soft  Genitourinary:     Exam position: Supine  Labia:         Right: No rash, tenderness or lesion  Left: No rash, tenderness or lesion  Vagina: Normal       Cervix: No cervical motion tenderness, discharge or friability  Adnexa:         Right: No mass, tenderness or fullness            Left: No mass, tenderness or fullness  Musculoskeletal:      Cervical back: Normal range of motion  Lymphadenopathy:      Lower Body: No right inguinal adenopathy  No left inguinal adenopathy

## 2023-02-03 ENCOUNTER — OFFICE VISIT (OUTPATIENT)
Dept: CARDIOLOGY CLINIC | Facility: CLINIC | Age: 43
End: 2023-02-03

## 2023-02-03 VITALS
SYSTOLIC BLOOD PRESSURE: 118 MMHG | DIASTOLIC BLOOD PRESSURE: 70 MMHG | HEART RATE: 66 BPM | BODY MASS INDEX: 39.87 KG/M2 | WEIGHT: 218 LBS | OXYGEN SATURATION: 97 %

## 2023-02-03 DIAGNOSIS — I10 ESSENTIAL HYPERTENSION: Primary | ICD-10-CM

## 2023-02-03 DIAGNOSIS — E78.2 MIXED HYPERLIPIDEMIA: ICD-10-CM

## 2023-02-03 NOTE — PROGRESS NOTES
Cardiology Consultation     Robert Gonzalez  [id number]  1980  HEART [facility] VASC[facility]  [address] [place] [id number]    1  Essential hypertension        2  Mixed hyperlipidemia          Chief Complaint   Patient presents with   • Follow-up     6 months f/u     HPI: Patient feels well, without complaints  No reported chest pain, shortness of breath, palpitations, lightheadedness, syncope, LE edema, orthopnea, PND, or significant weight changes  Patient remains active without any increased fatigue out of the ordinary          Patient Active Problem List   Diagnosis   • Essential hypertension   • Obesity, Class III, BMI 40-49 9 (morbid obesity) ([facility])   • Mixed hyperlipidemia   • Preoperative clearance   • YURIDIA (obstructive sleep apnea)   • Chronic gastritis with bleeding     Past Medical History:   Diagnosis Date   • Endometriosis      Social History     Socioeconomic History   • Marital status: /Civil Union     Spouse name: Not on file   • Number of children: Not on file   • Years of education: Not on file   • Highest education level: Not on file   Occupational History   • Not on file   Tobacco Use   • Smoking status: Never   • Smokeless tobacco: Never   Vaping Use   • Vaping Use: Never used   Substance and Sexual Activity   • Alcohol use: Not Currently     Comment: social   • Drug use: Never   • Sexual activity: Yes     Birth control/protection: Surgical   Other Topics Concern   • Not on file   Social History Narrative   • Not on file     Social Determinants of Health     Financial Resource Strain: Not on file   Food Insecurity: Not on file   Transportation Needs: Not on file   Physical Activity: Not on file   Stress: Not on file   Social Connections: Not on file   Intimate Partner Violence: Not on file   Housing Stability: Not on file      Family History   Problem Relation Age of Onset   • Hypertension Mother    • Ovarian cancer Mother 37   • Hyperlipidemia Mother    • Hyperthyroidism Mother    • Hyperlipidemia Father    • Heart disease Father    • Hypertension Father    • Skin cancer Father    • Stroke Father    • No Known Problems Brother    • No Known Problems Daughter    • No Known Problems Son    • Diabetes Maternal Aunt    • Ovarian cancer Maternal Aunt    • Diabetes Paternal Aunt    • No Known Problems Maternal Grandmother    • No Known Problems Maternal Grandfather    • No Known Problems Paternal Grandmother    • Heart disease Paternal Grandfather      Past Surgical History:   Procedure Laterality Date   • HYSTERECTOMY  2014   • OOPHORECTOMY Left 2014       Current Outpatient Medications:   •  amLODIPine (NORVASC) 5 mg tablet, Take 5 mg by mouth daily, Disp: , Rfl:   •  Biotin 1 MG CAPS, Take 1 capsule by mouth every other day, Disp: , Rfl:   •  Cholecalciferol (Vitamin D3) 1 25 MG (52492 UT) CAPS, Take 1 capsule by mouth once a week 1000 units caps, Disp: , Rfl:   •  fluticasone (FLONASE) 50 mcg/act nasal spray, 2 sprays into each nostril daily, Disp: , Rfl:   •  folic acid (FOLVITE) 338 MCG tablet, Take 800 mcg by mouth daily, Disp: , Rfl:   •  ipratropium (ATROVENT) 0 06 % nasal spray, 1-2 sprays into each nostril as needed in the morning and 1-2 sprays as needed at noon and 1-2 sprays as needed in the evening and 1-2 sprays as needed before bedtime  , Disp: , Rfl:   •  ipratropium (ATROVENT) 0 06 % nasal spray, USE 1 TO 2 SPRAYS IN EACH NOSTRIL FOUR TIMES DAILY AS NEEDED FOR RHINITIS, Disp: , Rfl:   •  MAGNESIUM PO, Take by mouth, Disp: , Rfl:   •  metoprolol succinate (TOPROL-XL) 25 mg 24 hr tablet, Take 25 mg by mouth daily, Disp: , Rfl:   •  omeprazole (PriLOSEC) 20 mg delayed release capsule, Take 1 capsule by mouth daily Every other day 4omg, Disp: , Rfl:   •  Rhubarb (ESTROVEN COMPLETE PO), Take by mouth, Disp: , Rfl:   •  ciclopirox (LOPROX) 0 77 % cream, APPLY TOPICALLY TO THE AFFECTED AREA TWICE DAILY  GENTLY MASSAGE INTO AFFECTED AREAS AND SURROUNDING SKIN (Patient not taking: Reported on 2/3/2023), Disp: , Rfl:   Allergies   Allergen Reactions   • Terbinafine Swelling     Tongue swelling; not 100% positive   • Iodine - Food Allergy    • Other      Seasonal, seafood   • Shellfish-Derived Products - Food Allergy      Vitals:    02/03/23 0824   BP: 118/70   BP Location: Left arm   Patient Position: Sitting   Cuff Size: Standard   Pulse: 66   SpO2: 97%   Weight: 98 9 kg (218 lb)       Labs:  Office Visit on 01/02/2023   Component Date Value   • Ventricular Rate 01/02/2023 66    • Atrial Rate 01/02/2023 66    • DE Interval 01/02/2023 152    • QRSD Interval 01/02/2023 82    • QT Interval 01/02/2023 408    • QTC Interval 01/02/2023 427    • P Axis 01/02/2023 32    • QRS Axis 01/02/2023 29    • T Wave [place] 01/02/2023 15    Appointment on 01/02/2023   Component Date Value   • WBC 01/02/2023 8  18    • RBC 01/02/2023 4 36    • Hemoglobin 01/02/2023 13 1    • Hematocrit 01/02/2023 41 4    • MCV 01/02/2023 95    • MCH 01/02/2023 30 0    • MCHC 01/02/2023 31 6    • RDW 01/02/2023 11 7    • Platelets 34/79/9223 383    • MPV 01/02/2023 9 0    • Sodium 01/02/2023 137    • Potassium 01/02/2023 4 3    • Chloride 01/02/2023 104    • CO2 01/02/2023 29    • ANION GAP 01/02/2023 4    • BUN 01/02/2023 8    • Creatinine 01/02/2023 0 74    • Glucose, Fasting 01/02/2023 94    • Calcium 01/02/2023 8 8    • AST 01/02/2023 37    • ALT 01/02/2023 90 (H)    • Alkaline Phosphatase 01/02/2023 65    • Total Protein 01/02/2023 7 3    • Albumin 01/02/2023 3 5    • Total Bilirubin 01/02/2023 0 39    • eGFR 01/02/2023 98    Hospital Outpatient Visit on 11/30/2022   Component Date Value   • Case Report 11/30/2022                      Value:Surgical Pathology Report                         Case: [id number]                                   Authorizing Provider:  Maverick Jaime MD        Collected:           11/30/2022 1155 Ordering Location:     [facility]        Received:            11/30/2022 [address] [place]  Endoscopy                                                          Pathologist:           Mikael Santos MD                                                    Specimen:    Stomach, r/o h pylori                                                                     • Final Diagnosis 11/30/2022                      Value: This result contains rich text formatting which cannot be displayed here  • Additional Information 11/30/2022                      Value: This result contains rich text formatting which cannot be displayed here  • Gross Description 11/30/2022                      Value: This result contains rich text formatting which cannot be displayed here     Telephone on 11/25/2022   Component Date Value   • Supplier Name 11/25/2022 [employer]    • Supplier Phone Number 11/25/2022 [phone]    • Order Status 11/25/2022 Delivery Successful    • Delivery Note 11/25/2022 DME set up 12/13/22 Philipp    • Delivery Request Date 11/25/2022 11/25/2022    • Date Delivered  11/25/2022 12/14/2022    • Item Description 11/25/2022 CPAP Machine, Resmed S10 Auto-CPAP    • Item Description 11/25/2022 PAP Mask, Nasal, Fit Upon Setup, N/A, 1 per 3 months    • Item Description 11/25/2022 PAP Mask Interface Cushion, Nasal, 2 per 1 month    • Item Description 11/25/2022 PAP Headgear, 1 per 6 months    • Item Description 11/25/2022 PAP Humidifier, Heated    • Item Description 11/25/2022 Disposable PAP Filter, 2 per 1 month    • Item Description 11/25/2022 Non-Disposable PAP Filter, 1 per 6 months    • Item Description 11/25/2022 PAP Machine Tubing, Heated, 1 per 3 months    • Item Description 11/25/2022 PAP Monitoring Modem    • Item Description 11/25/2022 Humidifier Water Chamber, 1 per 6 months    • Item Description 11/25/2022 PAP Chinstrap, 1 per 6 months      No results found for: CHOL, TRIG, HDL, LDLDIRECT  Imaging: No results found  Review of Systems:  Review of Systems   Constitutional: Negative for activity change, appetite change, chills, diaphoresis, fatigue and unexpected weight change  HENT: Negative for hearing loss, nosebleeds and sore throat  Eyes: Negative for photophobia and visual disturbance  Respiratory: Negative for cough, chest tightness, shortness of breath and wheezing  Cardiovascular: Negative for chest pain, palpitations and leg swelling  Gastrointestinal: Negative for abdominal pain, diarrhea, nausea and vomiting  Endocrine: Negative for polyuria  Genitourinary: Negative for dysuria, frequency and hematuria  Musculoskeletal: Negative for arthralgias, back pain, gait problem and neck pain  Skin: Negative for pallor and rash  Neurological: Negative for dizziness, syncope and headaches  Hematological: Does not bruise/bleed easily  Psychiatric/Behavioral: Negative for behavioral problems and confusion  Physical Exam:  Physical Exam  Vitals reviewed  Constitutional:       Appearance: She is well-developed  She is not diaphoretic  HENT:      Head: Normocephalic and atraumatic  Nose: Nose normal    Eyes:      General: No scleral icterus  Pupils: Pupils are equal, round, and reactive to light  Neck:      Vascular: No JVD  Cardiovascular:      Rate and Rhythm: Normal rate and regular rhythm  Heart sounds: Normal heart sounds  No murmur heard  No friction rub  No gallop  Pulmonary:      Effort: Pulmonary effort is normal  No respiratory distress  Breath sounds: Normal breath sounds  No wheezing or rales  Abdominal:      General: Bowel sounds are normal  There is no distension  Palpations: Abdomen is soft  Tenderness: There is no abdominal tenderness  Musculoskeletal:         General: No deformity  Normal range of motion  Cervical back: Normal range of motion and neck supple  Skin:     General: Skin is warm and dry  Findings: No rash  Neurological:      Mental Status: She is alert and oriented to person, place, and time  Cranial Nerves: No cranial nerve deficit  Psychiatric:         Behavior: Behavior normal        Blood pressure 118/70, pulse 66, weight 98 9 kg (218 lb), last menstrual period 04/01/2013, SpO2 97 %, not currently breastfeeding  EKG:  Normal sinus rhythm  Normal ECG    Discussion/Summary:  Pre-op cardiac eval: with risk factors for CAD including HTN , HLD, family history of CAD and no active symptoms, exam, or EKG findings suggesting active ischemia, arrhythmia, or CHF  Mild LE edema at end of day is likely related to dependent edema or amlodipine  Proceed with planned intermediate risk bariatric surgery without any further cardiac testing  HTN: well controlled on current regimen, but she is interested in coming off meds  Can try trial off metoprolol and check BP, if remains in 140's-150's, can increase amlodipine to 10mg  Currently well controlled on both meds, she forgot to hold metoprolol  Will hold off on changing meds until after surgery - will address with weight loss  HLD: continued off statin   in April 2022, almost at goal - likely to improve with weight loss

## 2023-02-08 NOTE — PROGRESS NOTES
Bariatric Nutrition Assessment Note    Type of surgery      Preop  Surgery Date: April 3, 2023- plan is for gastric sleeve  Surgeon: Dr Romero Figures  38 y o   female     Wt with BMI of 25: 132 4#  Pre-Op Excess Wt: 83 1#  LMP 04/01/2013 (Approximate)     Ht 5' 2" (1 575 m)   Wt 99 kg (218 lb 4 8 oz)   LMP 04/01/2013 (Approximate)   BMI 39 93 kg/m²      Pt has maintained her weight since starting the program 6 months ago    Wt Readings from Last 3 Encounters:   02/03/23 98 9 kg (218 lb)   01/27/23 99 3 kg (219 lb)   01/20/23 100 kg (220 lb 8 oz)       [place]- [place] Equation:  1578 kcal per day   Estimated calories for weight loss 893-1393 kcal per day  ( 1-2# per wk wt loss - sedentary )  Estimated protein needs 60-72 grams per day (1 0-1 2 gms/kg IBW )   Estimated fluid needs 70 ounces per day (-35 ml/kg IBW )      Weight History   Onset of Obesity: Adult- hysterectomy in 2014  Family history of obesity: No  Wt Loss Attempts: Commercial Programs ([employer], [place] [employer], etc )  Counseling with  MD  High Protein/Low CHO diets (Atkins, Union, etc )  Meal Replacements (Medifast, Slim Fast, etc )  OTC meds/supplements, noon   Patient has tried the above for 6 months or more with insufficient weight loss or weight regain, which is why patient has requested to be evaluated for weight loss surgery today  Maximum Wt Lost: 5 pound weight loss in 6 weeks while on Lent and eliminating all meat       Review of History and Medications   Past Medical History:   Diagnosis Date   • Endometriosis      Past Surgical History:   Procedure Laterality Date   • HYSTERECTOMY  2014   • OOPHORECTOMY Left 2014     Social History     Socioeconomic History   • Marital status: /Civil Union     Spouse name: Not on file   • Number of children: Not on file   • Years of education: Not on file   • Highest education level: Not on file   Occupational History   • Not on file Tobacco Use   • Smoking status: Never   • Smokeless tobacco: Never   Vaping Use   • Vaping Use: Never used   Substance and Sexual Activity   • Alcohol use: Not Currently     Comment: social   • Drug use: Never   • Sexual activity: Yes     Birth control/protection: Surgical   Other Topics Concern   • Not on file   Social History Narrative   • Not on file     Social Determinants of Health     Financial Resource Strain: Not on file   Food Insecurity: Not on file   Transportation Needs: Not on file   Physical Activity: Not on file   Stress: Not on file   Social Connections: Not on file   Intimate Partner Violence: Not on file   Housing Stability: Not on file       Current Outpatient Medications:   •  amLODIPine (NORVASC) 5 mg tablet, Take 5 mg by mouth daily, Disp: , Rfl:   •  Biotin 1 MG CAPS, Take 1 capsule by mouth every other day, Disp: , Rfl:   •  Cholecalciferol (Vitamin D3) 1 25 MG (23249 UT) CAPS, Take 1 capsule by mouth once a week 1000 units caps, Disp: , Rfl:   •  ciclopirox (LOPROX) 0 77 % cream, APPLY TOPICALLY TO THE AFFECTED AREA TWICE DAILY  GENTLY MASSAGE INTO AFFECTED AREAS AND SURROUNDING SKIN (Patient not taking: Reported on 2/3/2023), Disp: , Rfl:   •  fluticasone (FLONASE) 50 mcg/act nasal spray, 2 sprays into each nostril daily, Disp: , Rfl:   •  folic acid (FOLVITE) 118 MCG tablet, Take 800 mcg by mouth daily, Disp: , Rfl:   •  ipratropium (ATROVENT) 0 06 % nasal spray, 1-2 sprays into each nostril as needed in the morning and 1-2 sprays as needed at noon and 1-2 sprays as needed in the evening and 1-2 sprays as needed before bedtime  , Disp: , Rfl:   •  ipratropium (ATROVENT) 0 06 % nasal spray, USE 1 TO 2 SPRAYS IN EACH NOSTRIL FOUR TIMES DAILY AS NEEDED FOR RHINITIS, Disp: , Rfl:   •  MAGNESIUM PO, Take by mouth, Disp: , Rfl:   •  metoprolol succinate (TOPROL-XL) 25 mg 24 hr tablet, Take 25 mg by mouth daily, Disp: , Rfl:   •  omeprazole (PriLOSEC) 20 mg delayed release capsule, Take 1 capsule by mouth daily Every other day 4omg, Disp: , Rfl:   •  Rhubarb (ESTROVEN COMPLETE PO), Take by mouth, Disp: , Rfl:      Food Intake and Lifestyle Assessment   Food Intake Assessment completed via usual diet recall  Breakfast: Coffee with 1/2 and 1/2 or creamer ( may add 1 tsp creamer )   Snack: 0  Lunch: 11:30 am - three times per week   Slice of pizza with salad ( take out ) or will pack lunch with left overs   Or wrap with chicken salad , sometimes cubed cheese , pretzels   Sometimes chicken wings or salad or soup when  if off takes her out to eat   Snack: 0  Dinner: cooks dinner - 6:30 pm to 7:00 pm   Picks while cooking   Chicken breast, vegetables, and starch ( quinoa, parboiled rice, brown rice, ) or mashed potatoes beans or pasta ( whole wheat )   Snack: no   Beverage intake: coffee/tea- lots of water   Protein supplement: none currently   Estimated protein intake per day: ~30-40 grams   Estimated fluid intake per day: 80 ounces of fluid per day   Meals eaten away from home: once or twice per week   Typical meal pattern: 1-2 meals per day and 0 snacks per day  Eating Behaviors: Consumption of high calorie/ high fat foods  Food allergies or intolerances:    Allergies   Allergen Reactions   • Terbinafine Swelling     Tongue swelling; not 100% positive   • Iodine - Food Allergy    • Other      Seasonal, seafood   • Shellfish-Derived Products - Food Allergy      Had gallbladder out - so eggs and cheese cause diarrhea   Cultural or Amish considerations: Restorationist /      Physical Assessment  Physical Activity  Types of exercise: Walking  Current physical limitations: ankle pain from previous injury   SOB and heat intolerant     Psychosocial Assessment   Support systems: mother and fiancee   Socioeconomic factors:oral surgeon full time     Nutrition Diagnosis- continued   Diagnosis: Overweight / Obesity (NC-3 3)  Related to: Physical inactivity and Excessive energy intake  As Evidenced by: BMI >25 and Unintentional weight gain     Nutrition Prescription: Recommend the following diet  1400 kcal per day/ 90 grams protein/ 140 grams of carb/ 55 grams of fat, 15 grams of fiber  70 ounce or more of calorie free beverages  Interventions and Teaching   Discussed pre-op and post-op nutrition guidelines  Patient educated and handouts provided    Surgical changes to stomach / GI  Capacity of post-surgery stomach  Diet progression  Adequate hydration  Sugar and fat restriction to decrease "dumping syndrome"  Fat restriction to decrease steatorrhea  Expected weight loss  Weight loss plateaus/ possibility of weight regain  Exercise  Suggestions for pre-op diet  Nutrition considerations after surgery  Protein supplements  Meal planning and preparation  Appropriate carbohydrate, protein, and fat intake, and food/fluid choices to maximize safe weight loss, nutrient intake, and tolerance   Dietary and lifestyle changes  Possible problems with poor eating habits  Intuitive eating  Techniques for self monitoring and keeping daily food journal  Potential for food intolerance after surgery, and ways to deal with them including: lactose intolerance, nausea, reflux, vomiting, diarrhea, food intolerance, appetite changes, gas  Vitamin / Mineral supplementation of Multivitamin with minerals, Calcium, Vitamin B12, Iron, Fat Soluble vitamins and Vitamin D  Post-operative pregnancy guidelines- Pt had hysterectomy   Patient is not currently pregnant and doesn't desire to become pregnant a minimum of one year post-op    Educated on post op vitamins and minerals   Provided with samples of Bariatric Advantage, Bariatric Fusion, Celebrate, Procare and Bariatric Pal to try prior to surgery  Provided with samples of Bariatric Fusion, Bariatric Advantage and Unjury protein powder      Education provided to: patient and mother present and supportive     Barriers to learning: No barriers identified    Readiness to change: preparation and action    Prior research on procedure: discussed with provider and pre-op class    Comprehension: needs reinforcement and verbalizes understanding     Expected Compliance: good    Workflow: completed  Scheduled for surgery 4/3/2023    Recommendations  Pt is an appropriate candidate for surgery  Yes    Evaluation / Monitoring  Dietitian to Monitor: Eating pattern as discussed Tolerance of nutrition prescription Body weight Lab values Physical activity Bowel pattern  Pt has been having difficulty tolerating her cpap   She has been trying to get in touch with the sleep center as she feels the pressure is too hard  She plans to schedule a f/u with them in March when she has some days off  She has been consistently eating 3 meals per day or eating 2 meals and drinking one shake    She has started practicing the 30/60 rule, taking sips with meals  Overall, making lifestyle changes in preparation for surgery  Goals- continued   Complete lession plans 1-6 and Eat 3 meals per day   Drink one shake and eat 2 meals or eat 3 meals per day  Track steps 6000 or more per day   Continue to practice  30/60 rule   Try bariatric vitamin and protein samples provided       Time Spent:   30 minutes

## 2023-02-10 ENCOUNTER — OFFICE VISIT (OUTPATIENT)
Dept: BARIATRICS | Facility: CLINIC | Age: 43
End: 2023-02-10

## 2023-02-10 VITALS — WEIGHT: 218.3 LBS | HEIGHT: 62 IN | BODY MASS INDEX: 40.17 KG/M2

## 2023-02-10 DIAGNOSIS — E66.01 OBESITY, CLASS III, BMI 40-49.9 (MORBID OBESITY) (HCC): Primary | ICD-10-CM

## 2023-03-06 ENCOUNTER — ANESTHESIA EVENT (OUTPATIENT)
Dept: PERIOP | Facility: HOSPITAL | Age: 43
End: 2023-03-06

## 2023-03-16 ENCOUNTER — CLINICAL SUPPORT (OUTPATIENT)
Dept: BARIATRICS | Facility: CLINIC | Age: 43
End: 2023-03-16

## 2023-03-16 DIAGNOSIS — E66.01 OBESITY, CLASS III, BMI 40-49.9 (MORBID OBESITY) (HCC): Primary | ICD-10-CM

## 2023-03-20 RX ORDER — EPINEPHRINE 0.3 MG/.3ML
INJECTION SUBCUTANEOUS
COMMUNITY
Start: 2023-03-02

## 2023-03-20 NOTE — PRE-PROCEDURE INSTRUCTIONS
Pre-Surgery Instructions:   Medication Instructions   • amLODIPine (NORVASC) 5 mg tablet Take night before surgery   • Biotin 1 MG CAPS Stop taking 7 days prior to surgery  • Cholecalciferol (Vitamin D3) 1 25 MG (98171 UT) CAPS Stop taking 7 days prior to surgery  • EPINEPHrine (EPIPEN) 0 3 mg/0 3 mL SOAJ Uses PRN- OK to take day of surgery   • fluticasone (FLONASE) 50 mcg/act nasal spray Take day of surgery  • folic acid (FOLVITE) 446 MCG tablet Stop taking 7 days prior to surgery  • ipratropium (ATROVENT) 0 06 % nasal spray Uses PRN- OK to take day of surgery   • ipratropium (ATROVENT) 0 06 % nasal spray Uses PRN- OK to take day of surgery   • MAGNESIUM PO Stop taking 7 days prior to surgery  • metoprolol succinate (TOPROL-XL) 25 mg 24 hr tablet Take night before surgery   • omeprazole (PriLOSEC) 20 mg delayed release capsule Hold day of surgery  • Rhubarb (ESTROVEN COMPLETE PO) Stop taking 7 days prior to surgery  Medication instructions for day surgery reviewed  Please use only a sip of water to take your instructed medications  Avoid all over the counter vitamins, supplements and NSAIDS for one week prior to surgery per anesthesia guidelines  Tylenol is ok to take as needed  You will receive a call one business day prior to surgery with an arrival time and hospital directions  If your surgery is scheduled on a Monday, the hospital will be calling you on the Friday prior to your surgery  If you have not heard from anyone by 8pm, please call the hospital supervisor through the hospital  at [phone]  [facility] [phone])  Do not eat or drink anything after midnight the night before your surgery, including candy, mints, lifesavers, or chewing gum  Do not drink alcohol 24hrs before your surgery  Try not to smoke at least 24hrs before your surgery         Follow the pre surgery showering instructions as listed in the [facility] Surgical Experience Booklet” or otherwise provided by your surgeon's office  Do not shave the surgical area 24 hours before surgery  Do not apply any lotions, creams, including makeup, cologne, deodorant, or perfumes after showering on the day of your surgery  No contact lenses, eye make-up, or artificial eyelashes  Remove nail polish, including gel polish, and any artificial, gel, or acrylic nails if possible  Remove all jewelry including rings and body piercing jewelry  Wear causal clothing that is easy to take on and off  Consider your type of surgery  Keep any valuables, jewelry, piercings at home  Please bring any specially ordered equipment (sling, braces) if indicated  Arrange for a responsible person to drive you to and from the hospital on the day of your surgery  Visitor Guidelines discussed  Call the surgeon's office with any new illnesses, exposures, or additional questions prior to surgery  Please reference your [facility] Surgical Experience Booklet” for additional information to prepare for your upcoming surgery  Advised to bring CPAP DOS

## 2023-03-21 ENCOUNTER — TELEPHONE (OUTPATIENT)
Dept: BARIATRICS | Facility: CLINIC | Age: 43
End: 2023-03-21

## 2023-03-21 NOTE — TELEPHONE ENCOUNTER
Returned phone call  Answered questions concerning pre op liver shrinking diet  Pt also spoke with Alicia Capellan yesterday  She feels that she is on track with the diet    Will contact us if she has any further questions or concerns     Pt is feeling stressed, her father in law in missing, police report has been made, and she and her family are waiting to hear back from the police

## 2023-03-23 ENCOUNTER — OFFICE VISIT (OUTPATIENT)
Dept: BARIATRICS | Facility: CLINIC | Age: 43
End: 2023-03-23

## 2023-03-23 VITALS
HEART RATE: 96 BPM | BODY MASS INDEX: 39.47 KG/M2 | WEIGHT: 214.5 LBS | DIASTOLIC BLOOD PRESSURE: 88 MMHG | HEIGHT: 62 IN | TEMPERATURE: 99.6 F | OXYGEN SATURATION: 98 % | SYSTOLIC BLOOD PRESSURE: 130 MMHG

## 2023-03-23 DIAGNOSIS — E66.9 OBESITY, CLASS II, BMI 35-39.9: Primary | ICD-10-CM

## 2023-03-23 DIAGNOSIS — I10 ESSENTIAL HYPERTENSION: ICD-10-CM

## 2023-03-23 DIAGNOSIS — E78.2 MIXED HYPERLIPIDEMIA: ICD-10-CM

## 2023-03-23 DIAGNOSIS — G47.33 OSA (OBSTRUCTIVE SLEEP APNEA): ICD-10-CM

## 2023-03-23 PROBLEM — E66.812 OBESITY, CLASS II, BMI 35-39.9: Status: ACTIVE | Noted: 2023-03-23

## 2023-03-23 RX ORDER — METOPROLOL SUCCINATE 50 MG/1
TABLET, EXTENDED RELEASE ORAL
COMMUNITY
Start: 2023-03-17

## 2023-03-23 RX ORDER — CEFAZOLIN SODIUM 2 G/50ML
2000 SOLUTION INTRAVENOUS ONCE
OUTPATIENT
Start: 2023-04-03 | End: 2023-03-23

## 2023-03-23 RX ORDER — ACETAMINOPHEN 325 MG/1
975 TABLET ORAL ONCE
OUTPATIENT
Start: 2023-04-03 | End: 2023-03-23

## 2023-03-23 RX ORDER — ENOXAPARIN SODIUM 100 MG/ML
40 INJECTION SUBCUTANEOUS
OUTPATIENT
Start: 2023-04-04 | End: 2023-04-05

## 2023-03-23 RX ORDER — CELECOXIB 200 MG/1
200 CAPSULE ORAL ONCE
OUTPATIENT
Start: 2023-04-03 | End: 2023-03-23

## 2023-03-23 RX ORDER — GABAPENTIN 300 MG/1
300 CAPSULE ORAL ONCE
OUTPATIENT
Start: 2023-04-03 | End: 2023-03-23

## 2023-03-23 RX ORDER — SCOLOPAMINE TRANSDERMAL SYSTEM 1 MG/1
1 PATCH, EXTENDED RELEASE TRANSDERMAL ONCE
OUTPATIENT
Start: 2023-04-03 | End: 2023-03-23

## 2023-03-23 NOTE — H&P (VIEW-ONLY)
BARIATRIC H&P - BARIATRIC SURGERY  Kevin Jameson 38 y o  female MRN: [id number]  Unit/Bed#:  Encounter: [id number]      HPI:  Kevin Jameson is a 38 y o  female who presents with a long-standing history of morbid obesity  She was found to be a good candidate to undergo a bariatric operation upon [facility]  She is here today to discuss details of her surgery  Review of Systems   All other systems reviewed and are negative  Historical Information   Past Medical History:   Diagnosis Date   • Allergies    • COVID 07/18/2022   • Endometriosis    • Frequent sinus infections     spring time   • GERD (gastroesophageal reflux disease)    • Hypertension    • PONV (postoperative nausea and vomiting)      Past Surgical History:   Procedure Laterality Date   • CHOLECYSTECTOMY  2014   • EGD     • HYSTERECTOMY  2014   • OOPHORECTOMY Left 2014     Social History   Social History     Substance and Sexual Activity   Alcohol Use Not Currently    Comment: rarely     Social History     Substance and Sexual Activity   Drug Use Never     Social History     Tobacco Use   Smoking Status Never   Smokeless Tobacco Never     Family History: non-contributory    Meds/Allergies   all medications and allergies reviewed  Allergies   Allergen Reactions   • Terbinafine Swelling     Tongue swelling; not 100% positive   • Iodine - Food Allergy Throat Swelling   • Other      Seasonal, seafood   • Shellfish-Derived Products - Food Allergy Tongue Swelling       Objective     Current Vitals:          Invasive Devices     None                 Physical Exam  Vitals and nursing note reviewed  Constitutional:       General: She is not in acute distress  Appearance: Normal appearance  She is well-developed  She is not diaphoretic  HENT:      Head: Normocephalic and atraumatic  Nose: Nose normal    Eyes:      General: No scleral icterus  Right eye: No discharge           Left eye: No discharge  Conjunctiva/sclera: Conjunctivae normal    Cardiovascular:      Rate and Rhythm: Normal rate and regular rhythm  Heart sounds: Normal heart sounds  Pulmonary:      Effort: Pulmonary effort is normal  No respiratory distress  Breath sounds: Normal breath sounds  No stridor  No wheezing or rales  Chest:      Chest wall: No tenderness  Abdominal:      General: Bowel sounds are normal       Palpations: Abdomen is soft  Tenderness: There is no abdominal tenderness  There is no guarding or rebound  Comments: Abdomen is obese, soft and benign  Well-healed laparoscopic incisions  Musculoskeletal:         General: No deformity  Normal range of motion  Cervical back: Normal range of motion and neck supple  Lymphadenopathy:      Cervical: No cervical adenopathy  Skin:     General: Skin is warm and dry  Findings: No erythema or rash  Neurological:      Mental Status: She is alert and oriented to person, place, and time  Psychiatric:         Behavior: Behavior normal          Thought Content: Thought content normal          Judgment: Judgment normal          Lab Results: I have personally reviewed pertinent lab results  Imaging: I have personally reviewed pertinent reports  EKG, Pathology, and Other Studies: I have personally reviewed pertinent reports  The endoscopy showed Gastritis  Bile reflux into the antrum of the stomach  The biopsies revealed  Final Diagnosis  A  Stomach, biopsy:  - Fragments of gastric antral and oxyntic mucosa with mild chronic nonspecific inflammation   - No Helicobacter pylori organisms are identified with routine H&E stain  Assessment/PLAN:    38 y o  female morbidly obese found to be a good candidate to undergo a weight loss operation upon being enrolled here at the [facility]     Patient has a long history of morbid obesity and is presenting to discuss the surgical weight loss options  Despite the patient best efforts patient was unable to lose any meaningful or sustainable weight using nonsurgical means  We had a long discussion regarding all the surgical weight-loss options at our disposal at this point and reviewed the risks and benefits of each procedure in details as it relates to her age, BMI and medical conditions  She has been pre certified to undergo a Laparoscopic sleeve gastrectomy  We have discussed the 14%-20% incidence of post op heartburn after the sleeve gastrectomy and the fact that if this cannot be controlled with medication or conservative measures it might need to be converted to a Noe-en-Y gastric bypass  We have also discussed the fact that the patient needs to be followed up postoperatively and potentially get screening endoscopies in the future to rule out any development of pathology as a result of the sleeve gastrectomy  Here today to review her pre op test results  Has been medically cleared for the procedure     ++++++++++++++++++++++++  Patient has YURIDIA and wears a CPAP machine   ++++++++++++++++++++++++    I have discussed with her at length the risks and benefits of the operation and reiterated the components of our multidisciplinary program and the importance of compliance and follow up in the post operative period  Although there is a great statistical chance of improvement or even resolution of most of her associated comorbidities, the results vary from patient to patient and they largely depend on her commitment  The patient was also instructed with regards to the importance of behavior modification, nutritional counseling, support meeting attendance and lifestyle changes that are important to ensure success  She was given the opportunity to ask questions and I have answered all of them       I have addressed with the patient the level of CODE STATUS for this hospital stay and after explaining the different options currently she wishes to be a Level I  She understands and wishes to proceed  She has lost all the weight required prior to surgery      Jessica Arauz MD  3/23/2023  3:35 PM

## 2023-03-23 NOTE — H&P
BARIATRIC H&P - BARIATRIC SURGERY  Harini Dsouza 38 y o  female MRN: [id number]  Unit/Bed#:  Encounter: [id number]      HPI:  Harini Dsouza is a 38 y o  female who presents with a long-standing history of morbid obesity  She was found to be a good candidate to undergo a bariatric operation upon being enrolled [facility]  She is here today to discuss details of her surgery  Review of Systems   All other systems reviewed and are negative  Historical Information   Past Medical History:   Diagnosis Date   • Allergies    • COVID 07/18/2022   • Endometriosis    • Frequent sinus infections     spring time   • GERD (gastroesophageal reflux disease)    • Hypertension    • PONV (postoperative nausea and vomiting)      Past Surgical History:   Procedure Laterality Date   • CHOLECYSTECTOMY  2014   • EGD     • HYSTERECTOMY  2014   • OOPHORECTOMY Left 2014     Social History   Social History     Substance and Sexual Activity   Alcohol Use Not Currently    Comment: rarely     Social History     Substance and Sexual Activity   Drug Use Never     Social History     Tobacco Use   Smoking Status Never   Smokeless Tobacco Never     Family History: non-contributory    Meds/Allergies   all medications and allergies reviewed  Allergies   Allergen Reactions   • Terbinafine Swelling     Tongue swelling; not 100% positive   • Iodine - Food Allergy Throat Swelling   • Other      Seasonal, seafood   • Shellfish-Derived Products - Food Allergy Tongue Swelling       Objective     Current Vitals:          Invasive Devices     None                 Physical Exam  Vitals and nursing note reviewed  Constitutional:       General: She is not in acute distress  Appearance: Normal appearance  She is well-developed  She is not diaphoretic  HENT:      Head: Normocephalic and atraumatic  Nose: Nose normal    Eyes:      General: No scleral icterus  Right eye: No discharge           Left eye: No discharge  Conjunctiva/sclera: Conjunctivae normal    Cardiovascular:      Rate and Rhythm: Normal rate and regular rhythm  Heart sounds: Normal heart sounds  Pulmonary:      Effort: Pulmonary effort is normal  No respiratory distress  Breath sounds: Normal breath sounds  No stridor  No wheezing or rales  Chest:      Chest wall: No tenderness  Abdominal:      General: Bowel sounds are normal       Palpations: Abdomen is soft  Tenderness: There is no abdominal tenderness  There is no guarding or rebound  Comments: Abdomen is obese, soft and benign  Well-healed laparoscopic incisions  Musculoskeletal:         General: No deformity  Normal range of motion  Cervical back: Normal range of motion and neck supple  Lymphadenopathy:      Cervical: No cervical adenopathy  Skin:     General: Skin is warm and dry  Findings: No erythema or rash  Neurological:      Mental Status: She is alert and oriented to person, place, and time  Psychiatric:         Behavior: Behavior normal          Thought Content: Thought content normal          Judgment: Judgment normal          Lab Results: I have personally reviewed pertinent lab results  Imaging: I have personally reviewed pertinent reports  EKG, Pathology, and Other Studies: I have personally reviewed pertinent reports  The endoscopy showed Gastritis  Bile reflux into the antrum of the stomach  The biopsies revealed  Final Diagnosis  A  Stomach, biopsy:  - Fragments of gastric antral and oxyntic mucosa with mild chronic nonspecific inflammation   - No Helicobacter pylori organisms are identified with routine H&E stain  Assessment/PLAN:    38 y o  female morbidly obese found to be a good candidate to undergo a weight loss operation upon being enrolled here at the [facility]     Patient has a long history of morbid obesity and is presenting to discuss the surgical weight loss options  Despite the patient best efforts patient was unable to lose any meaningful or sustainable weight using nonsurgical means  We had a long discussion regarding all the surgical weight-loss options at our disposal at this point and reviewed the risks and benefits of each procedure in details as it relates to her age, BMI and medical conditions  She has been pre certified to undergo a Laparoscopic sleeve gastrectomy  We have discussed the 14%-20% incidence of post op heartburn after the sleeve gastrectomy and the fact that if this cannot be controlled with medication or conservative measures it might need to be converted to a Noe-en-Y gastric bypass  We have also discussed the fact that the patient needs to be followed up postoperatively and potentially get screening endoscopies in the future to rule out any development of pathology as a result of the sleeve gastrectomy  Here today to review her pre op test results  Has been medically cleared for the procedure     ++++++++++++++++++++++++  Patient has YURIDIA and wears a CPAP machine   ++++++++++++++++++++++++    I have discussed with her at length the risks and benefits of the operation and reiterated the components of our multidisciplinary program and the importance of compliance and follow up in the post operative period  Although there is a great statistical chance of improvement or even resolution of most of her associated comorbidities, the results vary from patient to patient and they largely depend on her commitment  The patient was also instructed with regards to the importance of behavior modification, nutritional counseling, support meeting attendance and lifestyle changes that are important to ensure success  She was given the opportunity to ask questions and I have answered all of them       I have addressed with the patient the level of CODE STATUS for this hospital stay and after explaining the different options currently she wishes to be a Level I  She understands and wishes to proceed  She has lost all the weight required prior to surgery      Charity Payne MD  3/23/2023  3:35 PM

## 2023-03-24 DIAGNOSIS — E66.01 MORBID OBESITY (HCC): Primary | ICD-10-CM

## 2023-03-27 DIAGNOSIS — E66.01 MORBID OBESITY (HCC): ICD-10-CM

## 2023-03-27 RX ORDER — OMEPRAZOLE 20 MG/1
20 CAPSULE, DELAYED RELEASE ORAL DAILY
Qty: 90 CAPSULE | Refills: 0 | Status: SHIPPED | OUTPATIENT
Start: 2023-03-27 | End: 2023-06-25

## 2023-03-27 RX ORDER — OMEPRAZOLE 20 MG/1
20 CAPSULE, DELAYED RELEASE ORAL DAILY
Qty: 30 CAPSULE | Refills: 3 | Status: SHIPPED | OUTPATIENT
Start: 2023-04-04 | End: 2023-03-27

## 2023-03-27 RX ORDER — OXYCODONE HYDROCHLORIDE 5 MG/1
5 TABLET ORAL EVERY 4 HOURS PRN
Qty: 10 TABLET | Refills: 0 | Status: SHIPPED | OUTPATIENT
Start: 2023-04-04

## 2023-03-27 RX ORDER — ENOXAPARIN SODIUM 100 MG/ML
40 INJECTION SUBCUTANEOUS DAILY
Qty: 5.2 ML | Refills: 0 | Status: SHIPPED | OUTPATIENT
Start: 2023-04-04 | End: 2023-04-17

## 2023-03-28 ENCOUNTER — TELEPHONE (OUTPATIENT)
Dept: BARIATRICS | Facility: CLINIC | Age: 43
End: 2023-03-28

## 2023-03-28 NOTE — TELEPHONE ENCOUNTER
Pre-op call was made to patient to follow up on how they are doing and to remind them to continue with all medical and dietary directions that were given at pre-op class regarding liver shrinking diet and hydration.  They were encouraged to purchase all vitamins and protein shakes for post op use as well as to begin Miralax three days prior to surgery as directed in Section 6 of their manual.  They were reminded of the Ensure Pre-surgery drinks protocol and to bring their completed yellow form with them to surgery as well as their CPAP-BiPAP machine if they use one.  Lastly, they were informed that they would be weighed the morning of surgery and to give the office a call if they had any further questions or concerns.

## 2023-03-30 ENCOUNTER — TELEPHONE (OUTPATIENT)
Dept: BARIATRICS | Facility: CLINIC | Age: 43
End: 2023-03-30

## 2023-03-30 NOTE — TELEPHONE ENCOUNTER
Patient called in questioning if she still needs to take vitamin d regularly prior to surgery or should she stop it out  She did talk with someone last week but cannot remember what she said  Please call the patient to discuss

## 2023-04-03 ENCOUNTER — HOSPITAL ENCOUNTER (INPATIENT)
Facility: HOSPITAL | Age: 43
LOS: 2 days | Discharge: HOME | End: 2023-04-05
Attending: SURGERY | Admitting: SURGERY

## 2023-04-03 ENCOUNTER — ANESTHESIA (OUTPATIENT)
Dept: PERIOP | Facility: HOSPITAL | Age: 43
End: 2023-04-03

## 2023-04-03 DIAGNOSIS — I10 ESSENTIAL HYPERTENSION, BENIGN: ICD-10-CM

## 2023-04-03 DIAGNOSIS — E66.01 MORBID OBESITY (HCC): ICD-10-CM

## 2023-04-03 DIAGNOSIS — E78.5 HYPERLIPIDEMIA, UNSPECIFIED HYPERLIPIDEMIA TYPE: ICD-10-CM

## 2023-04-03 DIAGNOSIS — G89.18 POST-OPERATIVE PAIN: Primary | ICD-10-CM

## 2023-04-03 DIAGNOSIS — E66.01 OBESITY, CLASS III, BMI 40-49.9 (MORBID OBESITY) (HCC): ICD-10-CM

## 2023-04-03 DIAGNOSIS — Z99.89 OSA ON CPAP: ICD-10-CM

## 2023-04-03 DIAGNOSIS — G47.33 OSA ON CPAP: ICD-10-CM

## 2023-04-03 PROCEDURE — 0DJ08ZZ INSPECTION OF UPPER INTESTINAL TRACT, VIA NATURAL OR ARTIFICIAL OPENING ENDOSCOPIC: ICD-10-PCS | Performed by: SURGERY

## 2023-04-03 PROCEDURE — 0DB64Z3 EXCISION OF STOMACH, PERCUTANEOUS ENDOSCOPIC APPROACH, VERTICAL: ICD-10-PCS | Performed by: SURGERY

## 2023-04-03 DEVICE — SEAMGUARD STPL REINF ENDO GIA ULTRA UNIV 60 PURPLE: Type: IMPLANTABLE DEVICE | Site: STOMACH | Status: FUNCTIONAL

## 2023-04-03 DEVICE — SEAMGUARD STPL REINF ENDO GIA ULTRA UNV 60 BLACK: Type: IMPLANTABLE DEVICE | Site: STOMACH | Status: FUNCTIONAL

## 2023-04-03 RX ORDER — ONDANSETRON 2 MG/ML
4 INJECTION INTRAMUSCULAR; INTRAVENOUS EVERY 6 HOURS PRN
Status: DISCONTINUED | OUTPATIENT
Start: 2023-04-03 | End: 2023-04-05 | Stop reason: HOSPADM

## 2023-04-03 RX ORDER — ROCURONIUM BROMIDE 10 MG/ML
INJECTION, SOLUTION INTRAVENOUS AS NEEDED
Status: DISCONTINUED | OUTPATIENT
Start: 2023-04-03 | End: 2023-04-03

## 2023-04-03 RX ORDER — FAMOTIDINE 10 MG/ML
10 INJECTION, SOLUTION INTRAVENOUS EVERY 12 HOURS SCHEDULED
Status: DISCONTINUED | OUTPATIENT
Start: 2023-04-03 | End: 2023-04-03

## 2023-04-03 RX ORDER — MAGNESIUM HYDROXIDE 1200 MG/15ML
LIQUID ORAL AS NEEDED
Status: DISCONTINUED | OUTPATIENT
Start: 2023-04-03 | End: 2023-04-03 | Stop reason: HOSPADM

## 2023-04-03 RX ORDER — HYDROMORPHONE HCL/PF 1 MG/ML
0.5 SYRINGE (ML) INJECTION
Status: DISCONTINUED | OUTPATIENT
Start: 2023-04-03 | End: 2023-04-03 | Stop reason: HOSPADM

## 2023-04-03 RX ORDER — FENTANYL CITRATE/PF 50 MCG/ML
25 SYRINGE (ML) INJECTION
Status: DISCONTINUED | OUTPATIENT
Start: 2023-04-03 | End: 2023-04-03 | Stop reason: HOSPADM

## 2023-04-03 RX ORDER — SODIUM CHLORIDE 9 MG/ML
125 INJECTION, SOLUTION INTRAVENOUS CONTINUOUS
Status: DISCONTINUED | OUTPATIENT
Start: 2023-04-03 | End: 2023-04-03

## 2023-04-03 RX ORDER — ENOXAPARIN SODIUM 100 MG/ML
40 INJECTION SUBCUTANEOUS
Status: COMPLETED | OUTPATIENT
Start: 2023-04-03 | End: 2023-04-03

## 2023-04-03 RX ORDER — CELECOXIB 200 MG/1
200 CAPSULE ORAL ONCE
Status: COMPLETED | OUTPATIENT
Start: 2023-04-03 | End: 2023-04-03

## 2023-04-03 RX ORDER — SCOLOPAMINE TRANSDERMAL SYSTEM 1 MG/1
1 PATCH, EXTENDED RELEASE TRANSDERMAL ONCE
Status: DISCONTINUED | OUTPATIENT
Start: 2023-04-03 | End: 2023-04-03

## 2023-04-03 RX ORDER — LIDOCAINE HYDROCHLORIDE 20 MG/ML
INJECTION, SOLUTION EPIDURAL; INFILTRATION; INTRACAUDAL; PERINEURAL AS NEEDED
Status: DISCONTINUED | OUTPATIENT
Start: 2023-04-03 | End: 2023-04-03

## 2023-04-03 RX ORDER — OXYCODONE HCL 5 MG/5 ML
10 SOLUTION, ORAL ORAL EVERY 4 HOURS PRN
Status: DISCONTINUED | OUTPATIENT
Start: 2023-04-03 | End: 2023-04-05 | Stop reason: HOSPADM

## 2023-04-03 RX ORDER — EPHEDRINE SULFATE 50 MG/ML
INJECTION INTRAVENOUS AS NEEDED
Status: DISCONTINUED | OUTPATIENT
Start: 2023-04-03 | End: 2023-04-03

## 2023-04-03 RX ORDER — PROMETHAZINE HYDROCHLORIDE 25 MG/ML
25 INJECTION, SOLUTION INTRAMUSCULAR; INTRAVENOUS EVERY 6 HOURS PRN
Status: DISCONTINUED | OUTPATIENT
Start: 2023-04-03 | End: 2023-04-05 | Stop reason: HOSPADM

## 2023-04-03 RX ORDER — DEXAMETHASONE SODIUM PHOSPHATE 10 MG/ML
INJECTION, SOLUTION INTRAMUSCULAR; INTRAVENOUS AS NEEDED
Status: DISCONTINUED | OUTPATIENT
Start: 2023-04-03 | End: 2023-04-03

## 2023-04-03 RX ORDER — IPRATROPIUM BROMIDE 42 UG/1
1 SPRAY, METERED NASAL 4 TIMES DAILY PRN
Status: DISCONTINUED | OUTPATIENT
Start: 2023-04-03 | End: 2023-04-04

## 2023-04-03 RX ORDER — ACETAMINOPHEN 325 MG/1
975 TABLET ORAL ONCE
Status: COMPLETED | OUTPATIENT
Start: 2023-04-03 | End: 2023-04-03

## 2023-04-03 RX ORDER — ACETAMINOPHEN 160 MG/5ML
975 SUSPENSION, ORAL (FINAL DOSE FORM) ORAL EVERY 8 HOURS
Status: DISCONTINUED | OUTPATIENT
Start: 2023-04-03 | End: 2023-04-05 | Stop reason: HOSPADM

## 2023-04-03 RX ORDER — FENTANYL CITRATE 50 UG/ML
INJECTION, SOLUTION INTRAMUSCULAR; INTRAVENOUS AS NEEDED
Status: DISCONTINUED | OUTPATIENT
Start: 2023-04-03 | End: 2023-04-03

## 2023-04-03 RX ORDER — CEFAZOLIN SODIUM 2 G/50ML
2000 SOLUTION INTRAVENOUS ONCE
Status: COMPLETED | OUTPATIENT
Start: 2023-04-03 | End: 2023-04-03

## 2023-04-03 RX ORDER — PROPOFOL 10 MG/ML
INJECTION, EMULSION INTRAVENOUS AS NEEDED
Status: DISCONTINUED | OUTPATIENT
Start: 2023-04-03 | End: 2023-04-03

## 2023-04-03 RX ORDER — ONDANSETRON 2 MG/ML
4 INJECTION INTRAMUSCULAR; INTRAVENOUS ONCE AS NEEDED
Status: COMPLETED | OUTPATIENT
Start: 2023-04-03 | End: 2023-04-03

## 2023-04-03 RX ORDER — ACETAMINOPHEN 325 MG/1
975 TABLET ORAL EVERY 8 HOURS
Status: DISCONTINUED | OUTPATIENT
Start: 2023-04-03 | End: 2023-04-05 | Stop reason: HOSPADM

## 2023-04-03 RX ORDER — NEOSTIGMINE METHYLSULFATE 1 MG/ML
INJECTION INTRAVENOUS AS NEEDED
Status: DISCONTINUED | OUTPATIENT
Start: 2023-04-03 | End: 2023-04-03

## 2023-04-03 RX ORDER — SODIUM CHLORIDE, SODIUM LACTATE, POTASSIUM CHLORIDE, CALCIUM CHLORIDE 600; 310; 30; 20 MG/100ML; MG/100ML; MG/100ML; MG/100ML
125 INJECTION, SOLUTION INTRAVENOUS CONTINUOUS
Status: DISCONTINUED | OUTPATIENT
Start: 2023-04-03 | End: 2023-04-05 | Stop reason: HOSPADM

## 2023-04-03 RX ORDER — OXYCODONE HCL 5 MG/5 ML
5 SOLUTION, ORAL ORAL EVERY 4 HOURS PRN
Status: DISCONTINUED | OUTPATIENT
Start: 2023-04-03 | End: 2023-04-05 | Stop reason: HOSPADM

## 2023-04-03 RX ORDER — FAMOTIDINE 10 MG/ML
20 INJECTION, SOLUTION INTRAVENOUS EVERY 12 HOURS SCHEDULED
Status: DISCONTINUED | OUTPATIENT
Start: 2023-04-03 | End: 2023-04-05 | Stop reason: HOSPADM

## 2023-04-03 RX ORDER — GLYCOPYRROLATE 0.2 MG/ML
INJECTION INTRAMUSCULAR; INTRAVENOUS AS NEEDED
Status: DISCONTINUED | OUTPATIENT
Start: 2023-04-03 | End: 2023-04-03

## 2023-04-03 RX ORDER — ONDANSETRON 2 MG/ML
INJECTION INTRAMUSCULAR; INTRAVENOUS AS NEEDED
Status: DISCONTINUED | OUTPATIENT
Start: 2023-04-03 | End: 2023-04-03

## 2023-04-03 RX ORDER — MIDAZOLAM HYDROCHLORIDE 2 MG/2ML
INJECTION, SOLUTION INTRAMUSCULAR; INTRAVENOUS AS NEEDED
Status: DISCONTINUED | OUTPATIENT
Start: 2023-04-03 | End: 2023-04-03

## 2023-04-03 RX ORDER — METOCLOPRAMIDE HYDROCHLORIDE 5 MG/ML
10 INJECTION INTRAMUSCULAR; INTRAVENOUS EVERY 6 HOURS PRN
Status: DISCONTINUED | OUTPATIENT
Start: 2023-04-03 | End: 2023-04-05 | Stop reason: HOSPADM

## 2023-04-03 RX ORDER — GABAPENTIN 300 MG/1
300 CAPSULE ORAL ONCE
Status: COMPLETED | OUTPATIENT
Start: 2023-04-03 | End: 2023-04-03

## 2023-04-03 RX ORDER — ENOXAPARIN SODIUM 100 MG/ML
40 INJECTION SUBCUTANEOUS
Status: DISCONTINUED | OUTPATIENT
Start: 2023-04-04 | End: 2023-04-05 | Stop reason: HOSPADM

## 2023-04-03 RX ORDER — DIPHENHYDRAMINE HCL 25 MG
25 TABLET ORAL
Status: DISCONTINUED | OUTPATIENT
Start: 2023-04-03 | End: 2023-04-05 | Stop reason: HOSPADM

## 2023-04-03 RX ADMIN — FENTANYL CITRATE 50 MCG: 50 INJECTION INTRAMUSCULAR; INTRAVENOUS at 16:59

## 2023-04-03 RX ADMIN — DEXAMETHASONE SODIUM PHOSPHATE 6 MG: 10 INJECTION INTRAMUSCULAR; INTRAVENOUS at 15:39

## 2023-04-03 RX ADMIN — METOCLOPRAMIDE 10 MG: 5 INJECTION, SOLUTION INTRAMUSCULAR; INTRAVENOUS at 21:03

## 2023-04-03 RX ADMIN — FENTANYL CITRATE 25 MCG: 50 INJECTION INTRAMUSCULAR; INTRAVENOUS at 16:32

## 2023-04-03 RX ADMIN — FENTANYL CITRATE 25 MCG: 50 INJECTION INTRAMUSCULAR; INTRAVENOUS at 17:01

## 2023-04-03 RX ADMIN — OXYCODONE HYDROCHLORIDE 10 MG: 5 SOLUTION ORAL at 21:03

## 2023-04-03 RX ADMIN — CELECOXIB 200 MG: 200 CAPSULE ORAL at 11:28

## 2023-04-03 RX ADMIN — PROPOFOL 200 MG: 10 INJECTION, EMULSION INTRAVENOUS at 15:25

## 2023-04-03 RX ADMIN — GLYCOPYRROLATE 0.6 MG: 0.2 INJECTION INTRAMUSCULAR; INTRAVENOUS at 16:44

## 2023-04-03 RX ADMIN — SODIUM CHLORIDE: 0.9 INJECTION, SOLUTION INTRAVENOUS at 16:42

## 2023-04-03 RX ADMIN — ENOXAPARIN SODIUM 40 MG: 100 INJECTION SUBCUTANEOUS at 11:28

## 2023-04-03 RX ADMIN — TRIMETHOBENZAMIDE HYDROCHLORIDE 200 MG: 100 INJECTION INTRAMUSCULAR at 18:04

## 2023-04-03 RX ADMIN — ONDANSETRON 4 MG: 2 INJECTION INTRAMUSCULAR; INTRAVENOUS at 17:10

## 2023-04-03 RX ADMIN — FENTANYL CITRATE 100 MCG: 50 INJECTION INTRAMUSCULAR; INTRAVENOUS at 15:25

## 2023-04-03 RX ADMIN — LIDOCAINE HYDROCHLORIDE 100 MG: 20 INJECTION, SOLUTION EPIDURAL; INFILTRATION; INTRACAUDAL; PERINEURAL at 15:25

## 2023-04-03 RX ADMIN — SODIUM CHLORIDE 125 ML/HR: 0.9 INJECTION, SOLUTION INTRAVENOUS at 11:46

## 2023-04-03 RX ADMIN — EPHEDRINE SULFATE 5 MG: 50 INJECTION INTRAVENOUS at 16:07

## 2023-04-03 RX ADMIN — FENTANYL CITRATE 25 MCG: 50 INJECTION, SOLUTION INTRAMUSCULAR; INTRAVENOUS at 17:37

## 2023-04-03 RX ADMIN — HYDROMORPHONE HYDROCHLORIDE 0.5 MG: 1 INJECTION, SOLUTION INTRAMUSCULAR; INTRAVENOUS; SUBCUTANEOUS at 17:56

## 2023-04-03 RX ADMIN — ONDANSETRON 4 MG: 2 INJECTION INTRAMUSCULAR; INTRAVENOUS at 16:33

## 2023-04-03 RX ADMIN — SODIUM CHLORIDE, SODIUM LACTATE, POTASSIUM CHLORIDE, AND CALCIUM CHLORIDE 125 ML/HR: .6; .31; .03; .02 INJECTION, SOLUTION INTRAVENOUS at 18:46

## 2023-04-03 RX ADMIN — CEFAZOLIN SODIUM 2000 MG: 2 SOLUTION INTRAVENOUS at 15:17

## 2023-04-03 RX ADMIN — SCOPALAMINE 1 PATCH: 1 PATCH, EXTENDED RELEASE TRANSDERMAL at 11:28

## 2023-04-03 RX ADMIN — FENTANYL CITRATE 25 MCG: 50 INJECTION, SOLUTION INTRAMUSCULAR; INTRAVENOUS at 17:42

## 2023-04-03 RX ADMIN — ACETAMINOPHEN 325MG 975 MG: 325 TABLET ORAL at 11:28

## 2023-04-03 RX ADMIN — MIDAZOLAM 2 MG: 1 INJECTION INTRAMUSCULAR; INTRAVENOUS at 15:17

## 2023-04-03 RX ADMIN — NEOSTIGMINE METHYLSULFATE 3 MG: 1 INJECTION INTRAVENOUS at 16:44

## 2023-04-03 RX ADMIN — FAMOTIDINE 20 MG: 10 INJECTION INTRAVENOUS at 21:03

## 2023-04-03 RX ADMIN — ROCURONIUM 50 MG: 50 INJECTION, SOLUTION INTRAVENOUS at 15:25

## 2023-04-03 RX ADMIN — GABAPENTIN 300 MG: 300 CAPSULE ORAL at 11:28

## 2023-04-03 NOTE — INTERVAL H&P NOTE
H&P reviewed  After examining the patient I find no changes in the patients condition since the H&P had been written      Vitals:    04/03/23 1128   BP: 127/67   Pulse: 74   Resp: 16   Temp: 97 9 °F (36 6 °C)   SpO2: 98%

## 2023-04-03 NOTE — ANESTHESIA POSTPROCEDURE EVALUATION
"Post-Op Assessment Note    CV Status:  Stable    Pain management: adequate     Mental Status:  Alert and awake   Hydration Status:  Euvolemic   PONV Controlled:  Controlled   Airway Patency:  Patent      Post Op Vitals Reviewed: Yes            No notable events documented      BP      Temp      Pulse     Resp      SpO2      /66 (BP Location: Right arm)   Pulse 63   Temp 97 7 °F (36 5 °C) (Oral)   Resp 17   Ht 5' 2\" (1 575 m)   Wt 95 2 kg (209 lb 14 1 oz)   LMP 04/01/2013 (Approximate)   SpO2 98%   BMI 38 39 kg/m²     "

## 2023-04-03 NOTE — ANESTHESIA PREPROCEDURE EVALUATION
Procedure:  GASTRECTOMY SLEEVE LAP & INTRAOPERATIVE EGD (Abdomen)    Relevant Problems   ANESTHESIA   (+) PONV (postoperative nausea and vomiting)      CARDIO   (+) Essential hypertension   (+) Mixed hyperlipidemia      GI/HEPATIC   (+) GERD (gastroesophageal reflux disease)      PULMONARY   (+) YURIDIA (obstructive sleep apnea)      Other   (+) Obesity, Class II, BMI 35-39 9        Physical Exam    Airway    Mallampati score: II  TM Distance: <3 FB  Neck ROM: full     Dental   No notable dental hx     Cardiovascular  Rhythm: regular, Rate: normal,     Pulmonary  Breath sounds clear to auscultation,     Other Findings        Anesthesia Plan  ASA Score- 2     Anesthesia Type- general with ASA Monitors  Additional Monitors:   Airway Plan: ETT  Plan Factors-Exercise tolerance (METS): >4 METS  Chart reviewed  Patient is not a current smoker  Obstructive sleep apnea risk education given perioperatively  Induction- intravenous  Postoperative Plan- Plan for postoperative opioid use  Informed Consent- Anesthetic plan and risks discussed with patient

## 2023-04-03 NOTE — OP NOTE
[facility]   [address] [place] [address], [address]  [phone] (Fax)      Operative Report  GASTRECTOMY SLEEVE LAP & INTRAOPERATIVE EGD     Patient Name: Shania Wagner    :  1980  MRN: [id number]  Patient Location: [facility] OR ROOM 06  Surgery Date : 4/3/2023  Surgeons:  Surgeon(s) and Role:     * Emma Carr MD - Primary     * Dayna Goodell, MD      * Naina Butler MD - Assisting    Diagnosis:    Pre-Op Diagnosis Codes: [facility] ([facility] ) [E66 01]  Body mass index is 38 39 kg/m²  YURIDIA on CPAP [G47 33, Z99 89]  Essential hypertension, benign [I10]  Hyperlipidemia, unspecified hyperlipidemia type [E78 5]    Post-Op Diagnosis Codes:     * Morbid obesity ([facility] ) [E66 01]     * YURIDIA on CPAP [G47 33, Z99 89]     * Essential hypertension, benign [I10]     * Hyperlipidemia, unspecified hyperlipidemia type [E78 5]      * Body mass index is 38 39 kg/m²  Procedure  1  Laparoscopic Sleeve Gastrectomy  2  Intraoperative Endoscopy    Specimen(s):  ID Type Source Tests Collected by Time Destination   1 : Portion of stomach Tissue Stomach TISSUE EXAM Emma Carr MD 4/3/2023 1607        Estimated Blood Loss:    Minimal * No LDAs found *    Anesthesia Type:     General    Operative Indications: Morbid obesity ([facility] ) [E66 01]  YURIDIA on CPAP [G47 33, Z99 89]  Essential hypertension, benign [I10]  Hyperlipidemia, unspecified hyperlipidemia type [E78 5]  Body mass index is 38 39 kg/m²  Operative Findings:    Normal anatomy    Complications:     None    Procedure and Technique:    INDICATION    Shania Wagner is a 38 y o  female with a Body mass index is 38 39 kg/m²  and a long standing history of morbid obesity and inability to lose a significant amount of weight on its own  This patient was found to be a good candidate to undergo a bariatric procedure upon being enrolled here at the [address] [place] [address] TECHNIQUE    The patient was taken to the operating room and placed in a supine position  A dose of IV antibiotic prophylaxis that consisted of Ancef 2g was given  Also 40 mg of subcutaneous Enoxaparin to prevent deep vein thrombosis were administered  Sequential compression devices were placed on both lower extremities  After satisfactory general anesthesia induction and endotracheal intubation was achieved, the extremities were placed and properly secured to prevent neuromuscular damage as best as possible  Subsequently, the abdominal wall was prepped and draped in a surgical standard sterile fashion  After a timeout was done and the patient was properly identified and the type of procedure was confirmed a supra-umbilical transverse skin incision was made and the subcutaneous tissues dissected  Access to the peritoneal cavity was gained with the Visiport trocar  With this device, we were able to visualize the layers of the abdominal wall, and enter the peritoneal cavity under direct visualization  Pneumoperitoneum was then established with CO2 insufflation  A four quadrant transversus abdominis plane block was performed under direct laparoscopic vision  After this was completed four additional trocars were placed: a 12 mm in the right upper quadrant subcostal position in the anterior axillary line, a 15-mm port was placed in the right flank midclavicular line, a 12-mm port was placed in the left upper quadrant subcostal position in the midclavicular line and another 12-mm port was placed in the left quadrant anterior axillary line lateral to the supraumbilical port  The [facility] liver retractor was placed in the subxiphoid position through the use of a 5-mm trocar incision  The patient was repositioned to a reverse Trendelenburg position  With the trocars in place, the dissection was begun  We divided the gastrocolic ligament with the energy device to enter the lesser sac   We continued to divide this ligament along the greater curvature of the stomach towards the angle of His  Special care was taken while dividing the short gastric vessels close to the spleen  This process was completely hemostatic  We then turned to the creation of an elongated and thin gastric pouch  A 36 [place] calibration tube was placed by the anesthesia staff into the stomach under our laparoscopic surveillance  Once the tip of the bougie was confirmed to be next to the pylorus, serial firings of the laparoscopic stapler with 60-mm cartridges were utilized  We started in a point inferior to the incisura angularis and the Crows foot nerve looking to preserve the gastric emptying  This was 5 to 6 centimeters proximal to the pylorus  The staple lines were reinforced with buttressing material  We created a pouch based on the lesser curve, and in vertical orientation  We continued the vertical serial firings of the stapler to the angle of His gently cinching the bougie with our laparoscopic stapler looking to create a thin pouch  As we approached the fundus of the stomach and the angle of His, the stapler loads were changed appropriately according to the variable thickness of the tissue  This completely  the pouch from the remnant stomach  We then turned our attention to the newly created pouch and examined it for bleeding or obvious defects on the staple lines and none were found  The distal stomach pouch was occluded with a Rosa Elena clamp, and an EGD as well as an air insufflation test was performed  Neither intraoperative bleeding nor leaks were detected  The periumbilical trocar site was dilated and the gastric remnant was externalized through it and passed off the surgical field to be sent to pathology  I then covered the staple line with a tongue of omentum in a Akash patch fashion and secured it in place with a single 2/0 Vicryl stitch      The sponge, needle and instrument count was reported complete  The previously dilated trocar site and the 15 mm were then closed with use of a suture closure device and a figure-of-eight with absorbable suture  The pneumoperitoneum was evacuated using the [facility] filter and smoke evacuator  The liver retractor and the remainder ports were then removed under direct laparoscopic visualization and no back bleeding was noted  The skin incisions were all closed with 4-0 absorbable subcuticular suture  The patient tolerated the procedure well, was extubated uneventfully and was transferred to the recovery room in stable condition  I was present for the entire length of the procedure as the attending of record  No qualified resident was available to assist   The presence of an assistant was necessary for camera holding, traction and counter traction and for help with suturing and stapling in addition to performing the intraop-EGD        Patient Disposition:    PACU     Signature: Homero Roberts MD  Date: April 3, 2023  Time: 4:22 PM

## 2023-04-04 LAB
ALBUMIN SERPL BCP-MCNC: 1.9 G/DL (ref 3.5–5)
ALP SERPL-CCNC: 20 U/L (ref 34–104)
ALT SERPL W P-5'-P-CCNC: 82 U/L (ref 7–52)
ANION GAP SERPL CALCULATED.3IONS-SCNC: 13 MMOL/L (ref 4–13)
AST SERPL W P-5'-P-CCNC: 25 U/L (ref 13–39)
BILIRUB SERPL-MCNC: 0.18 MG/DL (ref 0.2–1)
BUN SERPL-MCNC: 3 MG/DL (ref 5–25)
CALCIUM ALBUM COR SERPL-MCNC: 8.5 MG/DL (ref 8.3–10.1)
CALCIUM SERPL-MCNC: 6.8 MG/DL (ref 8.4–10.2)
CHLORIDE SERPL-SCNC: 106 MMOL/L (ref 96–108)
CO2 SERPL-SCNC: 13 MMOL/L (ref 21–32)
CREAT SERPL-MCNC: 0.22 MG/DL (ref 0.6–1.3)
ERYTHROCYTE [DISTWIDTH] IN BLOOD BY AUTOMATED COUNT: 11.9 % (ref 11.6–15.1)
ERYTHROCYTE [DISTWIDTH] IN BLOOD BY AUTOMATED COUNT: 11.9 % (ref 11.6–15.1)
GFR SERPL CREATININE-BSD FRML MDRD: 154 ML/MIN/1.73SQ M
GLUCOSE SERPL-MCNC: 68 MG/DL (ref 65–140)
HCT VFR BLD AUTO: 30 % (ref 34.8–46.1)
HCT VFR BLD AUTO: 40 % (ref 34.8–46.1)
HGB BLD-MCNC: 13.3 G/DL (ref 11.5–15.4)
HGB BLD-MCNC: 9.7 G/DL (ref 11.5–15.4)
MCH RBC QN AUTO: 30.3 PG (ref 26.8–34.3)
MCH RBC QN AUTO: 30.5 PG (ref 26.8–34.3)
MCHC RBC AUTO-ENTMCNC: 32.3 G/DL (ref 31.4–37.4)
MCHC RBC AUTO-ENTMCNC: 33.3 G/DL (ref 31.4–37.4)
MCV RBC AUTO: 92 FL (ref 82–98)
MCV RBC AUTO: 94 FL (ref 82–98)
PLATELET # BLD AUTO: 285 THOUSANDS/UL (ref 149–390)
PLATELET # BLD AUTO: 410 THOUSANDS/UL (ref 149–390)
PMV BLD AUTO: 8.6 FL (ref 8.9–12.7)
PMV BLD AUTO: 8.9 FL (ref 8.9–12.7)
POTASSIUM SERPL-SCNC: 4 MMOL/L (ref 3.5–5.3)
PROT SERPL-MCNC: 3.3 G/DL (ref 6.4–8.4)
RBC # BLD AUTO: 3.2 MILLION/UL (ref 3.81–5.12)
RBC # BLD AUTO: 4.36 MILLION/UL (ref 3.81–5.12)
SODIUM SERPL-SCNC: 132 MMOL/L (ref 135–147)
WBC # BLD AUTO: 15.26 THOUSAND/UL (ref 4.31–10.16)
WBC # BLD AUTO: 9.5 THOUSAND/UL (ref 4.31–10.16)

## 2023-04-04 RX ADMIN — FAMOTIDINE 20 MG: 10 INJECTION INTRAVENOUS at 21:40

## 2023-04-04 RX ADMIN — ONDANSETRON 4 MG: 2 INJECTION INTRAMUSCULAR; INTRAVENOUS at 02:16

## 2023-04-04 RX ADMIN — FAMOTIDINE 20 MG: 10 INJECTION INTRAVENOUS at 08:18

## 2023-04-04 RX ADMIN — SODIUM CHLORIDE, SODIUM LACTATE, POTASSIUM CHLORIDE, AND CALCIUM CHLORIDE 125 ML/HR: .6; .31; .03; .02 INJECTION, SOLUTION INTRAVENOUS at 10:39

## 2023-04-04 RX ADMIN — ACETAMINOPHEN 974.4 MG: 325 SUSPENSION ORAL at 18:28

## 2023-04-04 RX ADMIN — ACETAMINOPHEN 975 MG: 325 SUSPENSION ORAL at 02:16

## 2023-04-04 RX ADMIN — ENOXAPARIN SODIUM 40 MG: 100 INJECTION SUBCUTANEOUS at 11:04

## 2023-04-04 RX ADMIN — OXYCODONE HYDROCHLORIDE 10 MG: 5 SOLUTION ORAL at 02:16

## 2023-04-04 RX ADMIN — SODIUM CHLORIDE, SODIUM LACTATE, POTASSIUM CHLORIDE, AND CALCIUM CHLORIDE 125 ML/HR: .6; .31; .03; .02 INJECTION, SOLUTION INTRAVENOUS at 02:15

## 2023-04-04 RX ADMIN — PROMETHAZINE HYDROCHLORIDE 25 MG: 25 INJECTION INTRAMUSCULAR; INTRAVENOUS at 21:43

## 2023-04-04 RX ADMIN — OXYCODONE HYDROCHLORIDE 10 MG: 5 SOLUTION ORAL at 08:18

## 2023-04-04 RX ADMIN — OXYCODONE HYDROCHLORIDE 10 MG: 5 SOLUTION ORAL at 21:40

## 2023-04-04 RX ADMIN — ONDANSETRON 4 MG: 2 INJECTION INTRAMUSCULAR; INTRAVENOUS at 08:18

## 2023-04-04 RX ADMIN — SODIUM CHLORIDE, SODIUM LACTATE, POTASSIUM CHLORIDE, AND CALCIUM CHLORIDE 125 ML/HR: .6; .31; .03; .02 INJECTION, SOLUTION INTRAVENOUS at 18:28

## 2023-04-04 RX ADMIN — ACETAMINOPHEN 975 MG: 325 SUSPENSION ORAL at 10:40

## 2023-04-04 RX ADMIN — METOCLOPRAMIDE 10 MG: 5 INJECTION, SOLUTION INTRAMUSCULAR; INTRAVENOUS at 10:40

## 2023-04-04 RX ADMIN — OXYCODONE HYDROCHLORIDE 5 MG: 5 SOLUTION ORAL at 16:00

## 2023-04-04 NOTE — DISCHARGE INSTRUCTIONS
your medications from [address] [address] or cut your pills and open capsules, mix with liquid to drink  Take Tylenol every 8 hours around the clock, unless instructed otherwise  Take your omeprazole daily  It is important to stay hydrated and follow your discharge diet progression   Mild nausea is ok as long as you can drink fluids, sip very slowly and get up and walk during any periods of nausea  You may shower normally after 48 hours, but do not scrub incision sites, blot gently with clean towel to dry incisions  Take home medications as usual unless instructed otherwise while in hospital  Follow up with Dr Sena Ugarte and your PCP within the next week  Sleeve gastrectomy patients ONLY: Complete full course of lovenox injections!

## 2023-04-04 NOTE — DISCHARGE INSTR - AVS FIRST PAGE
Bariatric/Weight Loss Surgery  Hospital Discharge Instructions  ACTIVITY:  Progress as feels comfortable - a good rule is:  if you are doing something and it begins to hurt, stop doing the activity  Walk every hour while at home  You may walk stairs if you do so slowly  You may shower 48 hours after surgery  Do not scrub incision sites  Blot gently with clean towel to dry incisions  (see #4 below)   Use your incentive spirometer 10 times per hour while awake for 1 week after surgery  Do NOT drive for 48 hours after surgery  No driving 24 hours after taking certain prescription pain medications  Examples of such medication are Percocet, Darvocet, Oxycodone, Tylenol #3, and Tylenol with Codeine  DIET  Stay on a liquid diet for 7 days after your surgery date, sipping slowly  Refer to your manual for examples of choices  Remember to keep your liquids sugar free or low calorie  You may have protein drinks  Make sure to drink 48 to 64 ounces per day of fluids  You may advance to a pureed diet one week after surgery as instructed by your diet progression pamphlet  Once you get approval from your surgeon at your first post operative visit, you may advance to the soft diet and remain on soft diet for 8 weeks unless otherwise instructed  MEDICATIONS:  The abdominal nerve block will wear off during the first 1-2 days that you are home, and you may become sore (especially over incision site/sites where abdominal wall is sutured)  This may create a pulling sensation, especially while moving around, and will fade over time  Continue to take your Tylenol and your pain medication as instructed  Start vitamins and minerals one week after surgery or when you start stage 3/puree diet  Anti-acid Medication as per prescription  Other medications as indicated on the Physician Patient Discharge Instructions form given to you at the time of discharge    Make sure that you are splitting your pill or tablet medications in halves or fourths or even crushing them before you take them  Capsules should be opened and mixed with water or jello  You need to do this for at least 4 weeks after surgery  Eventually you will be able to take your medications the regular way as they were prescribed  You will need to consult with your Family Doctor in regards to all your prescribed medication, particularly those for blood pressure and diabetes  As you lose weight, medical conditions may change, requiring an alteration or elimination of the drug dose  Monitor blood pressure closely and call PCP with any concerns  Sleeve Gastrectomy patients ONLY:  Complete full course of lovenox injections! DO NOT TAKE BIRTH CONTROL(BC) MEDICATIONS, INSERT BC VAGINAL RINGS, OR PLACE IUD OR ANY OTHER BC METHODS UNTIL 31 DAYS FROM DAY OF DISCHARGE FROM HOSPITAL  THIS PLACES YOU AT HIGH RISK FOR A POTENTIALLY LIFE THREATENING BLOOD CLOT  Remember to always use barrier methods for birth control and speak to your GYN about using two forms of birth control to start 31 days after surgery  It is very important to avoid pregnancy until at least 18-24 months after surgery  INCISION CARE  You may shower and get incisions wet 2 days after surgery  No soaking tub baths or swimming for 30 days after surgery  Keep abdominal area and incisions clean  Use soap and water to create a good lather and rinse off  Do not scrub incisions  If you have a drain, empty the drain as the nurses instructed  FOLLOW-UP APPOINTMENT should be made for one week after discharge  Call surgeon’s office at [phone] to schedule an appointment      CALL YOUR DOCTOR FOR:  pain not controlled by pain medications, a temperature greater than 101 5° F, any increase or change in drainage or redness from any incision, any vomiting or inability to keep liquids down, shortness of breath, shoulder pain, or bleeding

## 2023-04-04 NOTE — UTILIZATION REVIEW
"Initial Clinical Review    Elective    IP    surgical procedure    Age/Sex: 38 y o  female     Surgery Date:    4/3/23    1  Procedure: Laparoscopic Sleeve Gastrectomy  Intraoperative Endoscopy    Anesthesia:    general    Operative Findings:    Normal anatomy    POD#1 Progress Note:   4/4   Continue post op care  Hemoglobin dropped this am to  9 7,  Found to be falsely low  Feels  She is  Not  Completely emptying bladder  And plan straight cath  Continue  Pain control ans antiemetics as needed         Admission Orders: Date/Time/Statement:   Admission Orders (From admission, onward)     Ordered        04/03/23 1836  Inpatient Admission  Once                      Orders Placed This Encounter   Procedures   • Inpatient Admission     Standing Status:   Standing     Number of Occurrences:   1     Order Specific Question:   Level of Care     Answer:   Med Surg [16]     Order Specific Question:   Bed Type     Answer:   Bariatric [1]     Order Specific Question:   Estimated length of stay     Answer:   Inpatient Only Surgery     Vital Signs: /81 (BP Location: Right arm)   Pulse 74   Temp 98 6 °F (37 °C) (Oral)   Resp 18   Ht 5' 2\" (1 575 m)   Wt 96 1 kg (211 lb 13 8 oz)   LMP 04/01/2013 (Approximate)   SpO2 97%   BMI 38 75 kg/m²     Pertinent Labs/Diagnostic Test Results:     Results from last 7 days   Lab Units 04/04/23  1031 04/04/23  0555   WBC Thousand/uL 15 26* 9 50   HEMOGLOBIN g/dL 13 3 9 7*   HEMATOCRIT % 40 0 30 0*   PLATELETS Thousands/uL 410* 285         Results from last 7 days   Lab Units 04/04/23  0555   SODIUM mmol/L 132*   POTASSIUM mmol/L 4 0   CHLORIDE mmol/L 106   CO2 mmol/L 13*   ANION GAP mmol/L 13   BUN mg/dL 3*   CREATININE mg/dL 0 22*   EGFR ml/min/1 73sq m 154   CALCIUM mg/dL 6 8*     Results from last 7 days   Lab Units 04/04/23  0555   AST U/L 25   ALT U/L 82*   ALK PHOS U/L 20*   TOTAL PROTEIN g/dL 3 3*   ALBUMIN g/dL 1 9*   TOTAL BILIRUBIN mg/dL 0 18*         Results from last " 7 days   Lab Units 04/04/23  0555   GLUCOSE RANDOM mg/dL 68         Diet:   Bariatric cl liq    Mobility:   Ambulate 4 times daily    DVT Prophylaxis:    SCD'S    Medications/Pain Control:   Scheduled Medications:  acetaminophen, 975 mg, Oral, Q8H   Or  acetaminophen, 975 mg, Oral, Q8H  enoxaparin, 40 mg, Subcutaneous, Q24H MACHO  famotidine, 20 mg, Intravenous, Q12H [address]      Continuous IV Infusions:  lactated ringers, 125 mL/hr, Intravenous, Continuous      PRN Meds:  diphenhydrAMINE, 25 mg, Oral, HS PRN  metoclopramide, 10 mg, Intravenous, Q6H PRN  ondansetron, 4 mg, Intravenous, Q6H PRN  oxyCODONE, 10 mg, Oral, Q4H PRN  oxyCODONE, 5 mg, Oral, Q4H PRN  promethazine, 25 mg, Intravenous, Q6H PRN        Network Utilization Review Department  ATTENTION: Please call with any questions or concerns to [phone] and carefully listen to the prompts so that you are directed to the right person  All voicemails are confidential   Esteban Pérez all requests for admission clinical reviews, approved or denied determinations and any other requests to dedicated fax number below belonging to the campus where the patient is receiving treatment   List of dedicated fax numbers for the Facilities:  [address] DENIALS (Administrative/Medical Necessity) [phone]   [address] (Maternity/NICU/Pediatrics) [phone]   [facility] Angela Suazo [phone]   [facility] [place] [address] [phone]   [address] [facility] [address] [place] [place] [id number] [place] [address] [facility] 28 [phone]   [address] [place] [facility] [place] [address] [phone]

## 2023-04-04 NOTE — PROGRESS NOTES
"Progress Note - Bariatric Surgery   Anand Vargas 38 y o  female MRN: [id number]  Unit/Bed#: E5 -01 Encounter: [id number]      Subjective/Objective     Subjective:  Patient with Obesity, Class III, BMI 40-49 9 (morbid obesity) ([address] ) 1 Day Post-Op s/p laparoscopic sleeve gastrectomy  Patient denies fevers, chills, sweats, SOB, CP, calf pain  Pain adequately controlled on oral pain medication  Ambulating without assistance, voiding well, and using incentive spirometer  Tolerating liquid diet without nausea or vomiting today  Feels like she is incompletely emptying after void  HGB dropped this am    Vital signs stable  CBC today shows   Lab Results   Component Value Date    HGB 9 7 (L) 04/04/2023      Lab Results   Component Value Date    HCT 30 0 (L) 04/04/2023     BMP obtained today   Lab Results   Component Value Date    CREATININE 0 22 (L) 04/04/2023      Lab Results   Component Value Date    K 4 0 04/04/2023            Objective:    /64 (BP Location: Left arm)   Pulse 61   Temp 98 7 °F (37 1 °C) (Oral)   Resp 18   Ht 5' 2\" (1 575 m)   Wt 96 1 kg (211 lb 13 8 oz)   LMP 04/01/2013 (Approximate)   SpO2 99%   BMI 38 75 kg/m²       Intake/Output Summary (Last 24 hours) at 4/4/2023 1006  Last data filed at 4/4/2023 0713  Gross per 24 hour   Intake 1000 ml   Output 900 ml   Net 100 ml       Invasive Devices     Peripheral Intravenous Line  Duration           Peripheral IV 04/03/23 Dorsal (posterior); Left Hand <1 day                ROS: 10-point system completed  All negative except see HPI      Physical Exam    General Appearance:    Alert, cooperative, no distress, appears stated age   Head:    Normocephalic, without obvious abnormality, atraumatic   Lungs:     Respirations unlabored   Heart:    Regular rate and rhythm   Abdomen:     Soft, appropriate tenderness, no masses, no organomegaly, non-distended   Extremities:   Extremities normal, atraumatic, no cyanosis or edema " Neurologic:  Incision:  Psych:   Normal strength and sensation    Clean, dry, and intact, no signs bleeding    Normal mood and affect       Lab, Imaging and other studies:  I have personally reviewed pertinent lab results  , CBC:   Lab Results   Component Value Date    WBC 9 50 04/04/2023    HGB 9 7 (L) 04/04/2023    HCT 30 0 (L) 04/04/2023    MCV 94 04/04/2023     04/04/2023    MCH 30 3 04/04/2023    MCHC 32 3 04/04/2023    RDW 11 9 04/04/2023    MPV 8 9 04/04/2023   , CMP:   Lab Results   Component Value Date    SODIUM 132 (L) 04/04/2023    K 4 0 04/04/2023     04/04/2023    CO2 13 (L) 04/04/2023    BUN 3 (L) 04/04/2023    CREATININE 0 22 (L) 04/04/2023    CALCIUM 6 8 (L) 04/04/2023    AST 25 04/04/2023    ALT 82 (H) 04/04/2023    ALKPHOS 20 (L) 04/04/2023    EGFR 154 04/04/2023        VTE Mechanical Prophylaxis: sequential compression device    Assessment/Plan  Patient is a 38 y o  female with Obesity, Class III, BMI 40-49 9 (morbid obesity) ([address] ) 1 Day Post-Op s/p laparoscopic Sleeve Gastrectomy with stable course  Patient afebrile and hemodynamically stable  - Encourage PO fluids  - Recommend ambulation, use of SCDs when not ambulating, and incentive spirometry  - holding Lovenox until repeat CBC   - follow up void check/bladder scan  - Plan to D/C patient home today pending anticipated progression    Plan of care was discussed with patient    Care plan discussed with Dr Butch Baker MD  Bariatric Surgery Fellow   4/4/2023  10:06 AM

## 2023-04-04 NOTE — PLAN OF CARE
Problem: PAIN - ADULT  Goal: Verbalizes/displays adequate comfort level or baseline comfort level  Description: Interventions:  - Encourage patient to monitor pain and request assistance  - Assess pain using appropriate pain scale  - Administer analgesics based on type and severity of pain and evaluate response  - Implement non-pharmacological measures as appropriate and evaluate response  - Consider cultural and social influences on pain and pain management  - Notify physician/advanced practitioner if interventions unsuccessful or patient reports new pain  Outcome: Progressing     Problem: INFECTION - ADULT  Goal: Absence or prevention of progression during hospitalization  Description: INTERVENTIONS:  - Assess and monitor for signs and symptoms of infection  - Monitor lab/diagnostic results  - Monitor all insertion sites, i e  indwelling lines, tubes, and drains  - Monitor endotracheal if appropriate and nasal secretions for changes in amount and color  - [place] appropriate cooling/warming therapies per order  - Administer medications as ordered  - Instruct and encourage patient and family to use good hand hygiene technique  - Identify and instruct in appropriate isolation precautions for identified infection/condition  Outcome: Progressing     Problem: DISCHARGE PLANNING  Goal: Discharge to home or other facility with appropriate resources  Description: INTERVENTIONS:  - Identify barriers to discharge w/patient and caregiver  - Arrange for needed discharge resources and transportation as appropriate  - Identify discharge learning needs (meds, wound care, etc )  - Arrange for interpretive services to assist at discharge as needed  - Refer to Case Management Department for coordinating discharge planning if the patient needs post-hospital services based on physician/advanced practitioner order or complex needs related to functional status, cognitive ability, or social support system  Outcome: Progressing Problem: Knowledge Deficit  Goal: Patient/family/caregiver demonstrates understanding of disease process, treatment plan, medications, and discharge instructions  Description: Complete learning assessment and assess knowledge base    Interventions:  - Provide teaching at level of understanding  - Provide teaching via preferred learning methods  Outcome: Progressing     Problem: GASTROINTESTINAL - ADULT  Goal: Minimal or absence of nausea and/or vomiting  Description: INTERVENTIONS:  - Administer IV fluids if ordered to ensure adequate hydration  - Maintain NPO status until nausea and vomiting are resolved  - Nasogastric tube if ordered  - Administer ordered antiemetic medications as needed  - Provide nonpharmacologic comfort measures as appropriate  - Advance diet as tolerated, if ordered  - Consider nutrition services referral to assist patient with adequate nutrition and appropriate food choices  Outcome: Progressing  Goal: Maintains or returns to baseline bowel function  Description: INTERVENTIONS:  - Assess bowel function  - Encourage oral fluids to ensure adequate hydration  - Administer IV fluids if ordered to ensure adequate hydration  - Administer ordered medications as needed  - Encourage mobilization and activity  - Consider nutritional services referral to assist patient with adequate nutrition and appropriate food choices  Outcome: Progressing  Goal: Maintains adequate nutritional intake  Description: INTERVENTIONS:  - Monitor percentage of each meal consumed  - Identify factors contributing to decreased intake, treat as appropriate  - Assist with meals as needed  - Monitor I&O, weight, and lab values if indicated  - Obtain nutrition services referral as needed  Outcome: Progressing     Problem: GENITOURINARY - ADULT  Goal: Maintains or returns to baseline urinary function  Description: INTERVENTIONS:  - Assess urinary function  - Encourage oral fluids to ensure adequate hydration if ordered  - Administer IV fluids as ordered to ensure adequate hydration  - Administer ordered medications as needed  - Offer frequent toileting  - Follow urinary retention protocol if ordered  Outcome: Progressing  Goal: Absence of urinary retention  Description: INTERVENTIONS:  - Assess patient’s ability to void and empty bladder  - Monitor I/O  - Bladder scan as needed  - Discuss with physician/AP medications to alleviate retention as needed  - Discuss catheterization for long term situations as appropriate  Outcome: Progressing

## 2023-04-05 VITALS
WEIGHT: 212.52 LBS | HEART RATE: 68 BPM | DIASTOLIC BLOOD PRESSURE: 71 MMHG | RESPIRATION RATE: 18 BRPM | SYSTOLIC BLOOD PRESSURE: 141 MMHG | OXYGEN SATURATION: 95 % | TEMPERATURE: 98.6 F | BODY MASS INDEX: 39.11 KG/M2 | HEIGHT: 62 IN

## 2023-04-05 RX ORDER — ACETAMINOPHEN 160 MG/5ML
975 SUSPENSION, ORAL (FINAL DOSE FORM) ORAL EVERY 8 HOURS
Qty: 638.4 ML | Refills: 0 | Status: SHIPPED | OUTPATIENT
Start: 2023-04-05 | End: 2023-04-12

## 2023-04-05 RX ADMIN — ACETAMINOPHEN 975 MG: 325 SUSPENSION ORAL at 02:21

## 2023-04-05 RX ADMIN — SODIUM CHLORIDE, SODIUM LACTATE, POTASSIUM CHLORIDE, AND CALCIUM CHLORIDE 125 ML/HR: .6; .31; .03; .02 INJECTION, SOLUTION INTRAVENOUS at 02:22

## 2023-04-05 RX ADMIN — ACETAMINOPHEN 975 MG: 325 TABLET ORAL at 11:34

## 2023-04-05 RX ADMIN — ENOXAPARIN SODIUM 40 MG: 100 INJECTION SUBCUTANEOUS at 09:11

## 2023-04-05 RX ADMIN — FAMOTIDINE 20 MG: 10 INJECTION INTRAVENOUS at 09:11

## 2023-04-05 RX ADMIN — OXYCODONE HYDROCHLORIDE 10 MG: 5 SOLUTION ORAL at 09:11

## 2023-04-05 NOTE — PLAN OF CARE
Problem: PAIN - ADULT  Goal: Verbalizes/displays adequate comfort level or baseline comfort level  Description: Interventions:  - Encourage patient to monitor pain and request assistance  - Assess pain using appropriate pain scale  - Administer analgesics based on type and severity of pain and evaluate response  - Implement non-pharmacological measures as appropriate and evaluate response  - Consider cultural and social influences on pain and pain management  - Notify physician/advanced practitioner if interventions unsuccessful or patient reports new pain  Outcome: Progressing     Problem: INFECTION - ADULT  Goal: Absence or prevention of progression during hospitalization  Description: INTERVENTIONS:  - Assess and monitor for signs and symptoms of infection  - Monitor lab/diagnostic results  - Monitor all insertion sites, i e  indwelling lines, tubes, and drains  - Monitor endotracheal if appropriate and nasal secretions for changes in amount and color  - [place] appropriate cooling/warming therapies per order  - Administer medications as ordered  - Instruct and encourage patient and family to use good hand hygiene technique  - Identify and instruct in appropriate isolation precautions for identified infection/condition  Outcome: Progressing     Problem: DISCHARGE PLANNING  Goal: Discharge to home or other facility with appropriate resources  Description: INTERVENTIONS:  - Identify barriers to discharge w/patient and caregiver  - Arrange for needed discharge resources and transportation as appropriate  - Identify discharge learning needs (meds, wound care, etc )  - Arrange for interpretive services to assist at discharge as needed  - Refer to Case Management Department for coordinating discharge planning if the patient needs post-hospital services based on physician/advanced practitioner order or complex needs related to functional status, cognitive ability, or social support system  Outcome: Progressing Problem: GASTROINTESTINAL - ADULT  Goal: Minimal or absence of nausea and/or vomiting  Description: INTERVENTIONS:  - Administer IV fluids if ordered to ensure adequate hydration  - Maintain NPO status until nausea and vomiting are resolved  - Nasogastric tube if ordered  - Administer ordered antiemetic medications as needed  - Provide nonpharmacologic comfort measures as appropriate  - Advance diet as tolerated, if ordered  - Consider nutrition services referral to assist patient with adequate nutrition and appropriate food choices  Outcome: Progressing  Goal: Maintains or returns to baseline bowel function  Description: INTERVENTIONS:  - Assess bowel function  - Encourage oral fluids to ensure adequate hydration  - Administer IV fluids if ordered to ensure adequate hydration  - Administer ordered medications as needed  - Encourage mobilization and activity  - Consider nutritional services referral to assist patient with adequate nutrition and appropriate food choices  Outcome: Progressing  Goal: Maintains adequate nutritional intake  Description: INTERVENTIONS:  - Monitor percentage of each meal consumed  - Identify factors contributing to decreased intake, treat as appropriate  - Assist with meals as needed  - Monitor I&O, weight, and lab values if indicated  - Obtain nutrition services referral as needed  Outcome: Progressing     Problem: GENITOURINARY - ADULT  Goal: Maintains or returns to baseline urinary function  Description: INTERVENTIONS:  - Assess urinary function  - Encourage oral fluids to ensure adequate hydration if ordered  - Administer IV fluids as ordered to ensure adequate hydration  - Administer ordered medications as needed  - Offer frequent toileting  - Follow urinary retention protocol if ordered  Outcome: Progressing  Goal: Absence of urinary retention  Description: INTERVENTIONS:  - Assess patient’s ability to void and empty bladder  - Monitor I/O  - Bladder scan as needed  - Discuss with physician/AP medications to alleviate retention as needed  - Discuss catheterization for long term situations as appropriate  Outcome: Progressing

## 2023-04-05 NOTE — PLAN OF CARE
Problem: PAIN - ADULT  Goal: Verbalizes/displays adequate comfort level or baseline comfort level  Description: Interventions:  - Encourage patient to monitor pain and request assistance  - Assess pain using appropriate pain scale  - Administer analgesics based on type and severity of pain and evaluate response  - Implement non-pharmacological measures as appropriate and evaluate response  - Consider cultural and social influences on pain and pain management  - Notify physician/advanced practitioner if interventions unsuccessful or patient reports new pain  Outcome: Progressing     Problem: INFECTION - ADULT  Goal: Absence or prevention of progression during hospitalization  Description: INTERVENTIONS:  - Assess and monitor for signs and symptoms of infection  - Monitor lab/diagnostic results  - Monitor all insertion sites, i e  indwelling lines, tubes, and drains  - Monitor endotracheal if appropriate and nasal secretions for changes in amount and color  - [place] appropriate cooling/warming therapies per order  - Administer medications as ordered  - Instruct and encourage patient and family to use good hand hygiene technique  - Identify and instruct in appropriate isolation precautions for identified infection/condition  Outcome: Progressing     Problem: DISCHARGE PLANNING  Goal: Discharge to home or other facility with appropriate resources  Description: INTERVENTIONS:  - Identify barriers to discharge w/patient and caregiver  - Arrange for needed discharge resources and transportation as appropriate  - Identify discharge learning needs (meds, wound care, etc )  - Arrange for interpretive services to assist at discharge as needed  - Refer to Case Management Department for coordinating discharge planning if the patient needs post-hospital services based on physician/advanced practitioner order or complex needs related to functional status, cognitive ability, or social support system  Outcome: Progressing Problem: Knowledge Deficit  Goal: Patient/family/caregiver demonstrates understanding of disease process, treatment plan, medications, and discharge instructions  Description: Complete learning assessment and assess knowledge base    Interventions:  - Provide teaching at level of understanding  - Provide teaching via preferred learning methods  Outcome: Progressing     Problem: GASTROINTESTINAL - ADULT  Goal: Minimal or absence of nausea and/or vomiting  Description: INTERVENTIONS:  - Administer IV fluids if ordered to ensure adequate hydration  - Maintain NPO status until nausea and vomiting are resolved  - Nasogastric tube if ordered  - Administer ordered antiemetic medications as needed  - Provide nonpharmacologic comfort measures as appropriate  - Advance diet as tolerated, if ordered  - Consider nutrition services referral to assist patient with adequate nutrition and appropriate food choices  Outcome: Progressing  Goal: Maintains or returns to baseline bowel function  Description: INTERVENTIONS:  - Assess bowel function  - Encourage oral fluids to ensure adequate hydration  - Administer IV fluids if ordered to ensure adequate hydration  - Administer ordered medications as needed  - Encourage mobilization and activity  - Consider nutritional services referral to assist patient with adequate nutrition and appropriate food choices  Outcome: Progressing  Goal: Maintains adequate nutritional intake  Description: INTERVENTIONS:  - Monitor percentage of each meal consumed  - Identify factors contributing to decreased intake, treat as appropriate  - Assist with meals as needed  - Monitor I&O, weight, and lab values if indicated  - Obtain nutrition services referral as needed  Outcome: Progressing     Problem: GENITOURINARY - ADULT  Goal: Maintains or returns to baseline urinary function  Description: INTERVENTIONS:  - Assess urinary function  - Encourage oral fluids to ensure adequate hydration if ordered  - Administer IV fluids as ordered to ensure adequate hydration  - Administer ordered medications as needed  - Offer frequent toileting  - Follow urinary retention protocol if ordered  Outcome: Progressing  Goal: Absence of urinary retention  Description: INTERVENTIONS:  - Assess patient’s ability to void and empty bladder  - Monitor I/O  - Bladder scan as needed  - Discuss with physician/AP medications to alleviate retention as needed  - Discuss catheterization for long term situations as appropriate  Outcome: Progressing     Problem: MOBILITY - ADULT  Goal: Maintain or return to baseline ADL function  Description: INTERVENTIONS:  -  Assess patient's ability to carry out ADLs; assess patient's baseline for ADL function and identify physical deficits which impact ability to perform ADLs (bathing, care of mouth/teeth, toileting, grooming, dressing, etc )  - Assess/evaluate cause of self-care deficits   - Assess range of motion  - Assess patient's mobility; develop plan if impaired  - Assess patient's need for assistive devices and provide as appropriate  - Encourage maximum independence but intervene and supervise when necessary  - Involve family in performance of ADLs  - Assess for home care needs following discharge   - Consider OT consult to assist with ADL evaluation and planning for discharge  - Provide patient education as appropriate  Outcome: Progressing  Goal: Maintains/Returns to pre admission functional level  Description: INTERVENTIONS:  - Perform BMAT or MOVE assessment daily    - Set and communicate daily mobility goal to care team and patient/family/caregiver  - Collaborate with rehabilitation services on mobility goals if consulted  - Perform Range of Motion 4 times a day  - Reposition patient every 2 hours    - Dangle patient 3 times a day  - Stand patient 3 times a day  - Ambulate patient 3 times a day  - Out of bed to chair 3 times a day   - Out of bed for meals 3 times a day  - Out of bed for toileting  - Record patient progress and toleration of activity level   Outcome: Progressing

## 2023-04-05 NOTE — DISCHARGE SUMMARY
Discharge Summary - Molina Bob 38 y o  female MRN: [id number]    Unit/Bed#: E5 -01 Encounter: [id number]      Pre-Operative Diagnosis: Pre-Op Diagnosis Codes:     * Morbid obesity ([address] ) [E66 01]     * YURIDIA on CPAP [G47 33, Z99 89]     * Essential hypertension, benign [I10]     * Hyperlipidemia, unspecified hyperlipidemia type [E78 5]    Post-Operative Diagnosis: Post-Op Diagnosis Codes:     * Morbid obesity ([address] ) [E66 01]     * YURIDIA on CPAP [G47 33, Z99 89]     * Essential hypertension, benign [I10]     * Hyperlipidemia, unspecified hyperlipidemia type [E78 5]    Procedures Performed:  Procedure(s):  GASTRECTOMY SLEEVE LAP & INTRAOPERATIVE EGD    Surgeon: Sara Cook MD    See H & P for full details of admission and Operative Note for full details of operations performed  Patient tolerated surgery well without complications  In the morning postoperative Day 1, the patient had mild nausea and abdominal pain  Tolerated a clear liquid diet without vomiting  Able to ambulate  Unfortunately, patient had urinary retention requiring straight cath x 2  She remained overnight for POD1 and starting on POD2 was able to start voiding independently  Patient was deemed ready for discharge home  Patient was seen and examined prior to discharge  Provisions for Follow-Up Care:  See After Visit Summary/Discharge Instructions for information related to follow-up care and home orders  Disposition: Home, in stable condition  Planned Readmission: No    Discharge Medications:  See After Visit Summary/Discharge Instructions for reconciled discharge medications provided to patient and family  Post Operative instructions: Reviewed with patient and/or family      Signature:   Morales Garcia MD  Date: 4/5/2023 Time: 11:51 AM

## 2023-04-05 NOTE — PROGRESS NOTES
"Progress Note - Bariatric Surgery   Shania Wagner 38 y o  female MRN: [id number]  Unit/Bed#: E5 -01 Encounter: [id number]      Subjective/Objective     Subjective:  Patient with Obesity, Class III, BMI 40-49 9 (morbid obesity) ([address] ) 2 Days Post-Op s/p laparoscopic sleeve gastrectomy  Patient denies fevers, chills, sweats, SOB, CP, calf pain  Pain adequately controlled on oral pain medication  Ambulating without assistance, voiding well, and using incentive spirometer  Tolerating liquid diet without nausea or vomiting today  Feels like she is incompletely emptying after void  Has been straight cath'd x 2 per protocol    Vital signs stable  CBC today shows   Lab Results   Component Value Date    HGB 13 3 04/04/2023      Lab Results   Component Value Date    HCT 40 0 04/04/2023     BMP obtained today   Lab Results   Component Value Date    CREATININE 0 22 (L) 04/04/2023      Lab Results   Component Value Date    K 4 0 04/04/2023            Objective:    /71   Pulse 68   Temp 98 6 °F (37 °C)   Resp 18   Ht 5' 2\" (1 575 m)   Wt 96 4 kg (212 lb 8 4 oz)   LMP 04/01/2013 (Approximate)   SpO2 95%   BMI 38 87 kg/m²       Intake/Output Summary (Last 24 hours) at 4/5/2023 0841  Last data filed at 4/5/2023 4696  Gross per 24 hour   Intake 2529 17 ml   Output 2943 ml   Net -413 83 ml       Invasive Devices     Peripheral Intravenous Line  Duration           Peripheral IV 04/03/23 Dorsal (posterior); Left Hand 1 day                ROS: 10-point system completed  All negative except see HPI      Physical Exam    General Appearance:    Alert, cooperative, no distress, appears stated age   Head:    Normocephalic, without obvious abnormality, atraumatic   Lungs:     Respirations unlabored   Heart:    Regular rate and rhythm   Abdomen:     Soft, appropriate tenderness, no masses, no organomegaly, non-distended   Extremities:   Extremities normal, atraumatic, no cyanosis or edema " Neurologic:  Incision:  Psych:   Normal strength and sensation    Clean, dry, and intact, no signs bleeding    Normal mood and affect       Lab, Imaging and other studies:  I have personally reviewed pertinent lab results  , CBC:   Lab Results   Component Value Date    WBC 15 26 (H) 04/04/2023    HGB 13 3 04/04/2023    HCT 40 0 04/04/2023    MCV 92 04/04/2023     (H) 04/04/2023    MCH 30 5 04/04/2023    MCHC 33 3 04/04/2023    RDW 11 9 04/04/2023    MPV 8 6 (L) 04/04/2023   , CMP:   No results found for: SODIUM, K, CL, CO2, ANIONGAP, BUN, CREATININE, GLUCOSE, CALCIUM, AST, ALT, ALKPHOS, PROT, BILITOT, EGFR     VTE Mechanical Prophylaxis: sequential compression device    Assessment/Plan  Patient is a 38 y o  female with Obesity, Class III, BMI 40-49 9 (morbid obesity) ([address] ) 2 Days Post-Op s/p laparoscopic Sleeve Gastrectomy with stable course  Patient afebrile and hemodynamically stable  - Encourage PO fluids  - Recommend ambulation, use of SCDs when not ambulating, and incentive spirometry  - ok for Lovenox   - follow up void check/bladder scan per protocol  - Plan to D/C patient home today pending anticipated progression    Plan of care was discussed with patient    Care plan discussed with Dr Tammy Mcdaniels MD  Bariatric Surgery Fellow   4/5/2023  8:41 AM

## 2023-04-06 ENCOUNTER — NURSE TRIAGE (OUTPATIENT)
Dept: OTHER | Facility: OTHER | Age: 43
End: 2023-04-06

## 2023-04-06 ENCOUNTER — TELEPHONE (OUTPATIENT)
Dept: BARIATRICS | Facility: CLINIC | Age: 43
End: 2023-04-06

## 2023-04-06 ENCOUNTER — HOSPITAL ENCOUNTER (EMERGENCY)
Facility: HOSPITAL | Age: 43
Discharge: HOME | End: 2023-04-07
Attending: EMERGENCY MEDICINE

## 2023-04-06 VITALS
DIASTOLIC BLOOD PRESSURE: 64 MMHG | HEART RATE: 74 BPM | SYSTOLIC BLOOD PRESSURE: 117 MMHG | OXYGEN SATURATION: 97 % | RESPIRATION RATE: 20 BRPM | BODY MASS INDEX: 38.63 KG/M2 | TEMPERATURE: 99 F | WEIGHT: 211.2 LBS

## 2023-04-06 DIAGNOSIS — R19.7 DIARRHEA: ICD-10-CM

## 2023-04-06 DIAGNOSIS — R11.0 NAUSEA: ICD-10-CM

## 2023-04-06 DIAGNOSIS — N39.0 UTI (URINARY TRACT INFECTION): Primary | ICD-10-CM

## 2023-04-06 DIAGNOSIS — R07.9 CHEST PAIN: ICD-10-CM

## 2023-04-06 DIAGNOSIS — M54.9 BACK PAIN: ICD-10-CM

## 2023-04-06 DIAGNOSIS — G89.18 POST-OPERATIVE PAIN: ICD-10-CM

## 2023-04-06 LAB
2HR DELTA HS TROPONIN: 0 NG/L
ALBUMIN SERPL BCP-MCNC: 4.3 G/DL (ref 3.5–5)
ALP SERPL-CCNC: 57 U/L (ref 34–104)
ALT SERPL W P-5'-P-CCNC: 176 U/L (ref 7–52)
ANION GAP SERPL CALCULATED.3IONS-SCNC: 10 MMOL/L (ref 4–13)
AST SERPL W P-5'-P-CCNC: 50 U/L (ref 13–39)
BACTERIA UR QL AUTO: ABNORMAL /HPF
BASOPHILS # BLD AUTO: 0.08 THOUSANDS/ÂΜL (ref 0–0.1)
BASOPHILS NFR BLD AUTO: 1 % (ref 0–1)
BILIRUB SERPL-MCNC: 0.53 MG/DL (ref 0.2–1)
BILIRUB UR QL STRIP: ABNORMAL
BUN SERPL-MCNC: 14 MG/DL (ref 5–25)
CALCIUM SERPL-MCNC: 9.7 MG/DL (ref 8.4–10.2)
CARDIAC TROPONIN I PNL SERPL HS: 7 NG/L
CARDIAC TROPONIN I PNL SERPL HS: 7 NG/L
CHLORIDE SERPL-SCNC: 101 MMOL/L (ref 96–108)
CLARITY UR: CLEAR
CO2 SERPL-SCNC: 25 MMOL/L (ref 21–32)
COLOR UR: YELLOW
CREAT SERPL-MCNC: 0.77 MG/DL (ref 0.6–1.3)
D DIMER PPP FEU-MCNC: 0.72 UG/ML FEU
EOSINOPHIL # BLD AUTO: 0.27 THOUSAND/ÂΜL (ref 0–0.61)
EOSINOPHIL NFR BLD AUTO: 2 % (ref 0–6)
ERYTHROCYTE [DISTWIDTH] IN BLOOD BY AUTOMATED COUNT: 11.9 % (ref 11.6–15.1)
GFR SERPL CREATININE-BSD FRML MDRD: 94 ML/MIN/1.73SQ M
GLUCOSE SERPL-MCNC: 94 MG/DL (ref 65–140)
GLUCOSE UR STRIP-MCNC: NEGATIVE MG/DL
HCT VFR BLD AUTO: 39.5 % (ref 34.8–46.1)
HGB BLD-MCNC: 13.2 G/DL (ref 11.5–15.4)
HGB UR QL STRIP.AUTO: NEGATIVE
IMM GRANULOCYTES # BLD AUTO: 0.06 THOUSAND/UL (ref 0–0.2)
IMM GRANULOCYTES NFR BLD AUTO: 1 % (ref 0–2)
KETONES UR STRIP-MCNC: ABNORMAL MG/DL
LEUKOCYTE ESTERASE UR QL STRIP: NEGATIVE
LYMPHOCYTES # BLD AUTO: 3.69 THOUSANDS/ÂΜL (ref 0.6–4.47)
LYMPHOCYTES NFR BLD AUTO: 33 % (ref 14–44)
MCH RBC QN AUTO: 30.3 PG (ref 26.8–34.3)
MCHC RBC AUTO-ENTMCNC: 33.4 G/DL (ref 31.4–37.4)
MCV RBC AUTO: 91 FL (ref 82–98)
MONOCYTES # BLD AUTO: 0.62 THOUSAND/ÂΜL (ref 0.17–1.22)
MONOCYTES NFR BLD AUTO: 6 % (ref 4–12)
MUCOUS THREADS UR QL AUTO: ABNORMAL
NEUTROPHILS # BLD AUTO: 6.41 THOUSANDS/ÂΜL (ref 1.85–7.62)
NEUTS SEG NFR BLD AUTO: 57 % (ref 43–75)
NITRITE UR QL STRIP: NEGATIVE
NON-SQ EPI CELLS URNS QL MICRO: ABNORMAL /HPF
NRBC BLD AUTO-RTO: 0 /100 WBCS
PH UR STRIP.AUTO: 6.5 [PH] (ref 4.5–8)
PLATELET # BLD AUTO: 381 THOUSANDS/UL (ref 149–390)
PMV BLD AUTO: 8.7 FL (ref 8.9–12.7)
POTASSIUM SERPL-SCNC: 3.2 MMOL/L (ref 3.5–5.3)
PROT SERPL-MCNC: 7.3 G/DL (ref 6.4–8.4)
PROT UR STRIP-MCNC: ABNORMAL MG/DL
RBC # BLD AUTO: 4.35 MILLION/UL (ref 3.81–5.12)
RBC #/AREA URNS AUTO: ABNORMAL /HPF
SODIUM SERPL-SCNC: 136 MMOL/L (ref 135–147)
SP GR UR STRIP.AUTO: 1.02 (ref 1–1.03)
UROBILINOGEN UR QL STRIP.AUTO: 0.2 E.U./DL
WBC # BLD AUTO: 11.13 THOUSAND/UL (ref 4.31–10.16)
WBC #/AREA URNS AUTO: ABNORMAL /HPF

## 2023-04-06 RX ORDER — DIPHENHYDRAMINE HYDROCHLORIDE 50 MG/ML
50 INJECTION INTRAMUSCULAR; INTRAVENOUS ONCE
Status: COMPLETED | OUTPATIENT
Start: 2023-04-06 | End: 2023-04-06

## 2023-04-06 RX ORDER — FENTANYL CITRATE 50 UG/ML
50 INJECTION, SOLUTION INTRAMUSCULAR; INTRAVENOUS ONCE
Status: COMPLETED | OUTPATIENT
Start: 2023-04-07 | End: 2023-04-07

## 2023-04-06 RX ORDER — FENTANYL CITRATE 50 UG/ML
50 INJECTION, SOLUTION INTRAMUSCULAR; INTRAVENOUS ONCE
Status: COMPLETED | OUTPATIENT
Start: 2023-04-06 | End: 2023-04-06

## 2023-04-06 RX ORDER — ONDANSETRON 2 MG/ML
4 INJECTION INTRAMUSCULAR; INTRAVENOUS ONCE
Status: COMPLETED | OUTPATIENT
Start: 2023-04-06 | End: 2023-04-06

## 2023-04-06 RX ADMIN — HYDROCORTISONE SODIUM SUCCINATE 200 MG: 100 INJECTION, POWDER, FOR SOLUTION INTRAMUSCULAR; INTRAVENOUS at 20:45

## 2023-04-06 RX ADMIN — DIPHENHYDRAMINE HYDROCHLORIDE 50 MG: 50 INJECTION, SOLUTION INTRAMUSCULAR; INTRAVENOUS at 23:50

## 2023-04-06 RX ADMIN — FENTANYL CITRATE 50 MCG: 50 INJECTION INTRAMUSCULAR; INTRAVENOUS at 19:16

## 2023-04-06 RX ADMIN — CEFTRIAXONE SODIUM 1000 MG: 10 INJECTION, POWDER, FOR SOLUTION INTRAVENOUS at 23:51

## 2023-04-06 RX ADMIN — ONDANSETRON 4 MG: 2 INJECTION INTRAMUSCULAR; INTRAVENOUS at 19:14

## 2023-04-06 NOTE — TELEPHONE ENCOUNTER
Post op follow up phone call completed  Pt is sipping liquids, using IS as instructed, reinforced importance of using IS to help prevent pneumonia  Ambulating about home without difficulty  Pain controlled with analgesia  Reaffirmed examples of liquid diet over the next week  Pt stated understanding about discharge instructions and medication adjustments  Tolerating self administration of Lovenox injections without difficulty  She reports back pain bilaterally in center of her back  She denies SOB or increased pain  If this increases or begins with and SOB she is to be evaluated for it  Follow up appt with surgeon scheduled for next week  Instructed to call with any additional questions or concerns

## 2023-04-06 NOTE — TELEPHONE ENCOUNTER
"    Reason for Disposition  • Sounds like a serious complication to the triager    Answer Assessment - Initial Assessment Questions  1  SYMPTOM: \"What's the main symptom you're concerned about? \" (e g , pain, fever, vomiting)      Unable to urinate, last voided 0800 this morning     2  ONSET: \"When did S/S  start? \"      This morning     3  SURGERY: \"What surgery was performed? \"      Gastric sleeve     4  DATE of SURGERY: \"When was surgery performed? \"      4/3/23    5  ANESTHESIA: \" What type of anesthesia did you have? \" (e g , general, spinal, epidural, local)      General     6  PAIN: \"Is there any pain? \" If Yes, ask: \"How bad is it? \"  (Scale 1-10; or mild, moderate, severe)      5/10    7  FEVER: \"Do you have a fever? \" If Yes, ask: \"What is your temperature, how was it measured, and when did it start? \"      Denies    8  VOMITING: \"Is there any vomiting? \" If yes, ask: \"How many times? \"      Denies    9  BLEEDING: \"Is there any bleeding? \" If Yes, ask: \"How much? \" and \"Where? \"      Denies    10  OTHER SYMPTOMS: \"Do you have any other symptoms? \" (e g , drainage from wound, painful urination, constipation)        Constipation-had liquid bowel movement at 1300, upper back pain, bloated and a feeling a fullness, feels uncomfortable to walk, pain when walking    Protocols used: POST-OP SYMPTOMS AND QUESTIONS-ADULT-[place]      "

## 2023-04-06 NOTE — ED ATTENDING ATTESTATION
4/6/2023  I, Brigitte Mcpherson MD, saw and evaluated the patient  I have discussed the patient with the resident/non-physician practitioner and agree with the resident's/non-physician practitioner's findings, Plan of Care, and MDM as documented in the resident's/non-physician practitioner's note, except where noted  All available labs and Radiology studies were reviewed  I was present for key portions of any procedure(s) performed by the resident/non-physician practitioner and I was immediately available to provide assistance  At this point I agree with the current assessment done in the Emergency Department  I have conducted an independent evaluation of this patient a history and physical is as follows:  Patient is a 37 yo female, Gastric Sleeve Surgery, couple of days back , c/o back pain, chest pain, SOB, hurts to take deep breath, also c/o abdominal pain, mild nausea, no vomiting; right leg feels tingly  PMH: Sleep apnea, GERD, HTN, Hysterectomy  On exam, vitals noted for mild tachycardia, Lungs CTA, CVS RR-Tachy, mild mid thoracic paraspinal tenderness; Abd soft, mild tenderness around incision areas, no distension; nonfocal neuro exam, no peripheral edema, no calf tenderness or swelling  Impression: Nonspecific back pain chest pain, 3 days postop, possible PE, we will check cardiac work-up including labs, EKG, troponin, D-dimer  ED Course     Labs, urine results reviewed, elevated D-dimer noted, patient would need 4-hour CT prep due to allergy  Urine noted for bacteria, patient with suprapubic pain, will treat for possible UTI/cystitis      Critical Care Time  Procedures

## 2023-04-06 NOTE — ED PROVIDER NOTES
History  Chief Complaint   Patient presents with   • Shortness of Breath     States had gastric sleeve surgery on Monday  Developed back pain and SOB today  States having difficulty urinating and urinary retention  Pt is a 37yo F who presents for back pain  Patient reports that she had a gastric sleeve surgery 3 days ago and was discharged from the hospital yesterday  She reports that she was doing well on discharge, however today has noticed increasing back pain and discomfort with breathing  Patient reports her back pain is bilateral and radiates to her chest   She states there is pressure with deep breathing and she therefore is feeling short of breath  Patient denies any associated palpitations  She states that she has been taking Tylenol at home with minimal relief  Patient also reporting some abdominal discomfort but states this has been stable and has not been worsening  Patient reports nausea without vomiting  Patient is on a liquid diet secondary to surgery and states she has been tolerating this well  Patient does report difficulty with urination that she states has been present since surgery  Patient states no dysuria or hematuria but does report having to strain to produce urine  Patient reports that she had a small amount of diarrhea this morning but but otherwise has not had a bowel movement since her surgery  She does report a small amount of blood in her diarrhea but states that she had blood in stool prior to surgery as well  Patient denies any fevers or chills  Patient reports she takes her medications regularly with no recent changes  Prior to Admission Medications   Prescriptions Last Dose Informant Patient Reported? Taking?    Biotin 1 MG CAPS Past Week  Yes Yes   Sig: Take 1 capsule by mouth every other day   EPINEPHrine (EPIPEN) 0 3 mg/0 3 mL SOAJ More than a month  Yes No   Sig: INJECT 0 3 ML INTO THE MUSCLE ONCE   MAGNESIUM PO Past Week  Yes Yes   Sig: Take by mouth every other day   Rhubarb (ESTROVEN COMPLETE PO) Past Week  Yes Yes   Sig: Take by mouth 2 (two) times a week   acetaminophen (TYLENOL) 160 mg/5 mL suspension 2023  No Yes   Sig: Take 30 4 mL (975 mg total) by mouth every 8 (eight) hours for 7 days   ciclopirox (LOPROX) 0 77 % cream   Yes No   enoxaparin (LOVENOX) 40 mg/0 4 mL 2023  No Yes   Sig: Inject 0 4 mL (40 mg total) under the skin daily for 13 days Do not start before 2023  folic acid (FOLVITE) 894 MCG tablet Past Week  Yes Yes   Sig: Take 800 mcg by mouth every other day   ipratropium (ATROVENT) 0 06 % nasal spray Past Week  Yes Yes   Si-2 sprays into each nostril as needed in the morning and 1-2 sprays as needed at noon and 1-2 sprays as needed in the evening and 1-2 sprays as needed before bedtime  omeprazole (PriLOSEC) 20 mg delayed release capsule 2023  No Yes   Sig: Take 1 capsule (20 mg total) by mouth daily   oxyCODONE (Roxicodone) 5 immediate release tablet 2023  No Yes   Sig: Take 1 tablet (5 mg total) by mouth every 4 (four) hours as needed for moderate pain Max Daily Amount: 30 mg Do not start before 2023        Facility-Administered Medications: None       Past Medical History:   Diagnosis Date   • Allergies    • COVID 2022   • Endometriosis    • Frequent sinus infections     spring time   • GERD (gastroesophageal reflux disease)    • Hypertension    • PONV (postoperative nausea and vomiting)        Past Surgical History:   Procedure Laterality Date   • CHOLECYSTECTOMY     • EGD     • HYSTERECTOMY     • OOPHORECTOMY Left    • NV LAPS [address] [place] [address] [facility] LONGITUDINAL GASTRECTOMY N/A 4/3/2023    Procedure: GASTRECTOMY SLEEVE LAP & INTRAOPERATIVE EGD;  Surgeon: Amy Johnson MD;  Location: [facility];  Service: Bariatrics       Family History   Problem Relation Age of Onset   • Hypertension Mother    • Ovarian cancer Mother 37   • Hyperlipidemia Mother    • Hyperthyroidism Mother    • Hyperlipidemia Father    • Heart disease Father    • Hypertension Father    • Skin cancer Father    • Stroke Father    • No Known Problems Brother    • No Known Problems Daughter    • No Known Problems Son    • Diabetes Maternal Aunt    • Ovarian cancer Maternal Aunt    • Diabetes Paternal Aunt    • No Known Problems Maternal Grandmother    • No Known Problems Maternal Grandfather    • No Known Problems Paternal Grandmother    • Heart disease Paternal Grandfather      I have reviewed and agree with the history as documented  E-Cigarette/Vaping   • E-Cigarette Use Never User      E-Cigarette/Vaping Substances   • Nicotine No    • THC No    • CBD No    • Flavoring No    • Other No    • Unknown No      Social History     Tobacco Use   • Smoking status: Never   • Smokeless tobacco: Never   Vaping Use   • Vaping Use: Never used   Substance Use Topics   • Alcohol use: Not Currently     Comment: rarely   • Drug use: Never        Review of Systems   Respiratory: Positive for chest tightness and shortness of breath  Gastrointestinal: Positive for abdominal pain (stable), blood in stool, diarrhea and nausea  Genitourinary: Positive for difficulty urinating  Musculoskeletal: Positive for back pain  All other systems reviewed and are negative  Physical Exam  ED Triage Vitals [04/06/23 1824]   Temperature Pulse Respirations Blood Pressure SpO2   99 °F (37 2 °C) (!) 107 20 124/81 97 %      Temp Source Heart Rate Source Patient Position - Orthostatic VS BP Location FiO2 (%)   Oral Monitor Sitting Right arm --      Pain Score       8             Orthostatic Vital Signs  Vitals:    04/06/23 1824 04/06/23 2136   BP: 124/81 117/64   Pulse: (!) 107 74   Patient Position - Orthostatic VS: Sitting Lying       Physical Exam  Vitals reviewed  Constitutional:       General: She is not in acute distress  Appearance: She is well-developed  She is not toxic-appearing or diaphoretic     HENT:      Head: Normocephalic and atraumatic  Right Ear: External ear normal       Left Ear: External ear normal       Nose: Nose normal       Mouth/Throat:      Mouth: Mucous membranes are moist       Pharynx: Oropharynx is clear  Eyes:      Extraocular Movements: Extraocular movements intact  Pupils: Pupils are equal, round, and reactive to light  Cardiovascular:      Rate and Rhythm: Regular rhythm  Tachycardia present  Heart sounds: Normal heart sounds  No murmur heard  Pulmonary:      Effort: Pulmonary effort is normal  No respiratory distress  Breath sounds: Normal breath sounds  No wheezing  Abdominal:      General: There is no distension  Palpations: Abdomen is soft  Tenderness: There is abdominal tenderness (mild epigastric and LUQ)  There is no right CVA tenderness, left CVA tenderness, guarding or rebound  Comments: Surgical sites clean, dry, and intact   Musculoskeletal:         General: No swelling, tenderness or deformity  Normal range of motion  Cervical back: Normal range of motion and neck supple  No tenderness or bony tenderness  Thoracic back: No bony tenderness  Tenderness: paraspinal bilaterally  Lumbar back: No tenderness or bony tenderness  Right lower leg: No edema  Left lower leg: No edema  Skin:     General: Skin is warm and dry  Capillary Refill: Capillary refill takes less than 2 seconds  Coloration: Skin is not pale  Findings: No erythema or rash  Neurological:      General: No focal deficit present  Mental Status: She is alert and oriented to person, place, and time  Psychiatric:         Speech: Speech normal          Behavior: Behavior is cooperative           ED Medications  Medications   fentanyl citrate (PF) 100 MCG/2ML 50 mcg (50 mcg Intravenous Given 4/6/23 1916)   ondansetron (ZOFRAN) injection 4 mg (4 mg Intravenous Given 4/6/23 1914)   hydrocortisone (Solu-CORTEF) injection 200 mg (200 mg Intravenous Given 4/6/23 2045) Followed by   diphenhydrAMINE (BENADRYL) injection 50 mg (50 mg Intravenous Given 4/6/23 2350)   ceftriaxone (ROCEPHIN) 1 g/50 mL in dextrose IVPB (0 mg Intravenous Stopped 4/7/23 0021)   fentanyl citrate (PF) 100 MCG/2ML 50 mcg (50 mcg Intravenous Given 4/7/23 0030)   iohexol (OMNIPAQUE) 350 MG/ML injection (SINGLE-DOSE) 100 mL (100 mL Intravenous Given 4/7/23 0052)   iohexol (OMNIPAQUE) 300 mg/mL injection 10 mL (10 mL Oral Given 4/7/23 0052)       Diagnostic Studies  Results Reviewed     Procedure Component Value Units Date/Time    Urine culture [[id number]]  (Abnormal) Collected: 04/06/23 1912    Lab Status: Final result Specimen: Urine Updated: 04/07/23 1812     Urine Culture [id number] cfu/ml Gram Negative Yovani    HS Troponin I 2hr [[id number]]  (Normal) Collected: 04/06/23 2143    Lab Status: Final result Specimen: Blood from Arm, Right Updated: 04/06/23 2220     hs TnI 2hr 7 ng/L      Delta 2hr hsTnI 0 ng/L     Comprehensive metabolic panel [[id number]]  (Abnormal) Collected: 04/06/23 1914    Lab Status: Final result Specimen: Blood from Arm, Right Updated: 04/06/23 1958     Sodium 136 mmol/L      Potassium 3 2 mmol/L      Chloride 101 mmol/L      CO2 25 mmol/L      ANION GAP 10 mmol/L      BUN 14 mg/dL      Creatinine 0 77 mg/dL      Glucose 94 mg/dL      Calcium 9 7 mg/dL      AST 50 U/L       U/L      Alkaline Phosphatase 57 U/L      Total Protein 7 3 g/dL      Albumin 4 3 g/dL      Total Bilirubin 0 53 mg/dL      eGFR 94 ml/min/1 73sq m     Narrative:      [place] guidelines for Chronic Kidney Disease (CKD):   •  Stage 1 with normal or high GFR (GFR > 90 mL/min/1 73 square meters)  •  Stage 2 Mild CKD (GFR = 60-89 mL/min/1 73 square meters)  •  Stage 3A Moderate CKD (GFR = 45-59 mL/min/1 73 square meters)  •  Stage 3B Moderate CKD (GFR = 30-44 mL/min/1 73 square meters)  •  Stage 4 Severe CKD (GFR = 15-29 mL/min/1 73 square meters)  •  Stage 5 End Stage CKD (GFR <15 mL/min/1 73 square meters)  Note: GFR calculation is accurate only with a steady state creatinine    HS Troponin 0hr (reflex protocol) [[id number]]  (Normal) Collected: 04/06/23 1914    Lab Status: Final result Specimen: Blood from Arm, Right Updated: 04/06/23 1952     hs TnI 0hr 7 ng/L     D-Dimer [[id number]]  (Abnormal) Collected: 04/06/23 1914    Lab Status: Final result Specimen: Blood from Arm, Right Updated: 04/06/23 1946     D-Dimer, Quant 0 72 ug/ml FEU     Urine Microscopic [[id number]]  (Abnormal) Collected: 04/06/23 1912    Lab Status: Final result Specimen: Urine, Clean Catch Updated: 04/06/23 1943     RBC, UA None Seen /hpf      WBC, UA 1-2 /hpf      Epithelial Cells Occasional /hpf      Bacteria, UA Innumerable /hpf      MUCUS THREADS Occasional    CBC and differential [[id number]]  (Abnormal) Collected: 04/06/23 1914    Lab Status: Final result Specimen: Blood from Arm, Right Updated: 04/06/23 1926     WBC 11 13 Thousand/uL      RBC 4 35 Million/uL      Hemoglobin 13 2 g/dL      Hematocrit 39 5 %      MCV 91 fL      MCH 30 3 pg      MCHC 33 4 g/dL      RDW 11 9 %      MPV 8 7 fL      Platelets 257 Thousands/uL      nRBC 0 /100 WBCs      Neutrophils Relative 57 %      Immat GRANS % 1 %      Lymphocytes Relative 33 %      Monocytes Relative 6 %      Eosinophils Relative 2 %      Basophils Relative 1 %      Neutrophils Absolute 6 41 Thousands/µL      Immature Grans Absolute 0 06 Thousand/uL      Lymphocytes Absolute 3 69 Thousands/µL      Monocytes Absolute 0 62 Thousand/µL      Eosinophils Absolute 0 27 Thousand/µL      Basophils Absolute 0 08 Thousands/µL     Urine Macroscopic, POC [[id number]]  (Abnormal) Collected: 04/06/23 1912    Lab Status: Final result Specimen: Urine Updated: 04/06/23 1913     Color, UA Yellow     Clarity, UA Clear     pH, UA 6 5     Leukocytes, UA Negative     Nitrite, UA Negative     Protein, UA 30 (1+) mg/dl      Glucose, UA Negative mg/dl      Ketones, UA >=160 (4+) mg/dl Urobilinogen, UA 0 2 E U /dl      Bilirubin, UA Small     Occult Blood, UA Negative     Specific Gravity, UA 1 020    Narrative:      CLINITEK RESULT                 PE Study with CT Abdomen and Pelvis with contrast   Final Result by Winter Alvarado MD (04/07 0109)         1  No evidence of acute pulmonary embolus, thoracic aortic aneurysm or dissection  No acute cardiopulmonary process  2   Findings suggestive of enteritis and diarrhea  Workstation performed: [id number]               Procedures  Procedures      ED Course  ED Course as of 04/08/23 2237   Thu Apr 06, 2023 1916 Leukocytes, UA: Negative   1916 Nitrite, UA: Negative  No evidence of infection  1916 Ketones, UA(!): >=160 (4+)  Likely from decreased PO intake  1923 ECG 12 lead  Procedure Note: EKG  Date/Time: 04/06/23 7:23 PM   Interpreted by: Mikala Jackson MD  Indications / Diagnosis: CP  ECG reviewed by me, the ED Physician: yes   The EKG demonstrates:  Rhythm: normal sinus  Intervals: normal intervals  Axis: normal axis  QRS/Blocks: normal QRS  ST Changes: No acute ST Changes, no STD/TOOTIE  T wave inversions in inferior leads unchanged from prior  T wave flattening in lateral leads appears new from prior  S1Q3T3 present but not new from prior  1927 WBC(!): 11 13  Mildly elevated  Decreased from prior  Non-diagnostic  Awaiting further w/u     1928 CBC and differential(!)  Reviewed and without actionable derangement  1940 PVR 62   1947 D-Dimer, Quant(!): 0 72  Elevated  Will proceed with CTA  1947 WBC, UA(!): 1-2   1947 Bacteria, UA(!): Innumerable  Suspicious for infection  1958 AST(!): 50  Elevated from prior  1958 ALT(!): 176  Elevated from prior  1958 Comprehensive metabolic panel(!)  Reviewed and without actionable derangement  1958 hs TnI 0hr: 7  Will require delta  2009 Pt made aware of results thus far and plan for CTA   Pt reporting history of contrast allergy with rash, throat, and tongue swelling  Will do 4 hour prep  Pt agreeable  2211 Pulse: 74  Interval improvement  Now WNL  Lynette Martinez 8 hsTnI: 0  No need for further trending  2355 Pt requesting additional pain medication  Ordered  Fri Apr 07, 2023   0110 PE Study with CT Abdomen and Pelvis with contrast  1  No evidence of acute pulmonary embolus, thoracic aortic aneurysm or dissection  No acute cardiopulmonary process  2   Findings suggestive of enteritis and diarrhea  0118 Reassessed pt who is resting comfortably with normal vitals  Discussed all results as well as plan for DC with continued symptomatic treatment at home and close follow-up  Discussed return precautions and pt agreeable to plan  HEART Risk Score    Flowsheet Row Most Recent Value   Heart Score Risk Calculator    History 0 Filed at: 04/07/2023 0011   ECG 1 Filed at: 04/07/2023 0011   Age 0 Filed at: 04/07/2023 0011   Risk Factors 1 Filed at: 04/07/2023 0011   Troponin 0 Filed at: 04/07/2023 0011   HEART Score 2 Filed at: 04/07/2023 0011                          Wells' Criteria for PE    Flowsheet Row Most Recent Value   Wells' Criteria for PE    Clinical signs and symptoms of DVT 0 Filed at: 04/06/2023 2239   PE is primary diagnosis or equally likely 0 Filed at: 04/06/2023 1957   HR >100 1 5 Filed at: 04/06/2023 1957   Immobilization at least 3 days or Surgery in the previous 4 weeks 1 5 Filed at: 04/06/2023 1957   Previous, objectively diagnosed PE or DVT 0 Filed at: 04/06/2023 1957   Hemoptysis 0 Filed at: 04/06/2023 1957   Malignancy with treatment within 6 months or palliative 0 Filed at: 04/06/2023 1957   Wells' Criteria Total 3 Filed at: 04/06/2023 1957            Medical Decision Making  Pt is a 35yo F who presents with back pain and shortness of breath  Exam pertinent for muscular back tenderness and tachycardia      Differential diagnosis to include but not limited to PE, myocarditis, pericarditis, pneumonia, pancreatitis, muscular pain, urinary tract infection, pyelo  Will plan for labs including troponin and D-dimer  Will also obtain UA  Will treat symptomatically and reassess  See ED course for results and details  Plan to discharge pt with f/u to PCP and urology  Discussed returning the ED with new or worsening of symptoms  Discussed use of over the counter medications and previously prescribed medications as stated on the bottle as needed for pain  Discussed taking new medication as prescribed and to completion  Pt expressed understanding of discharge instructions, return precautions, and medication instructions  All questions were answered and pt was discharged without incident  Amount and/or Complexity of Data Reviewed  Labs: ordered  Decision-making details documented in ED Course  Radiology: ordered  Decision-making details documented in ED Course  ECG/medicine tests:  Decision-making details documented in ED Course  Risk  Prescription drug management  Disposition  Final diagnoses:   UTI (urinary tract infection)   Back pain   Chest pain   Post-operative pain   Diarrhea   Nausea     Time reflects when diagnosis was documented in both MDM as applicable and the Disposition within this note     Time User Action Codes Description Comment    4/7/2023 12:10 AM Mychal Concepcion Add [N39 0] UTI (urinary tract infection)     4/7/2023 12:11 AM Mychal Concepcion Add [M54 9] Back pain     4/7/2023 12:11 AM Mychal Concepcion Add [R07 9] Chest pain     4/7/2023 12:13 AM Mychal Concepcion Add [G89 18] Post-operative pain     4/7/2023  1:19 AM Mychal Concepcion Add [R19 7] Diarrhea     4/7/2023  1:20 AM Mychal Concepcion Add [R11 0] Nausea       ED Disposition     ED Disposition   Discharge    Condition   Stable    Date/Time   Fri Apr 7, 2023  1:19 AM    [address] [place] [address] discharge to home/self care                 Follow-up Information     Follow up With Specialties Details Why Contact Info Additional Information    Elliott Lyons MD Internal Medicine Call  As needed [facility]  [phone]       [facility]rology Call on 4/7/2023  [address]  [place] [phone]  [phone]  [facility], [address] [address], [facility], [place], [address] [phone]          Discharge Medication List as of 4/7/2023  1:21 AM      START taking these medications    Details   cephalexin (KEFLEX) 500 mg capsule Take 1 capsule (500 mg total) by mouth every 6 (six) hours for 10 days, Starting Fri 4/7/2023, Until Mon 4/17/2023, Normal      methocarbamol (ROBAXIN) 500 mg tablet Take 1 tablet (500 mg total) by mouth 2 (two) times a day, Starting Fri 4/7/2023, Normal      ondansetron (ZOFRAN) 4 mg tablet Take 1 tablet (4 mg total) by mouth every 6 (six) hours, Starting Fri 4/7/2023, Normal         CONTINUE these medications which have NOT CHANGED    Details   acetaminophen (TYLENOL) 160 mg/5 mL suspension Take 30 4 mL (975 mg total) by mouth every 8 (eight) hours for 7 days, Starting Wed 4/5/2023, Until Wed 4/12/2023, Normal      Biotin 1 MG CAPS Take 1 capsule by mouth every other day, Historical Med      enoxaparin (LOVENOX) 40 mg/0 4 mL Inject 0 4 mL (40 mg total) under the skin daily for 13 days Do not start before April 4, 2023 , Starting Tue 4/4/2023, Until Mon 6/596/59/5374, Normal      folic acid (FOLVITE) 293 MCG tablet Take 800 mcg by mouth every other day, Historical Med      ipratropium (ATROVENT) 0 06 % nasal spray 1-2 sprays into each nostril as needed in the morning and 1-2 sprays as needed at noon and 1-2 sprays as needed in the evening and 1-2 sprays as needed before bedtime  , Starting Tue 2/15/2022, Historical Med      MAGNESIUM PO Take by mouth every other day, Historical Med      omeprazole (PriLOSEC) 20 mg delayed release capsule Take 1 capsule (20 mg total) by mouth daily, Starting Mon 3/27/2023, Until Sun 6/25/2023, Normal      oxyCODONE (Roxicodone) 5 immediate release tablet Take 1 tablet (5 mg total) by mouth every 4 (four) hours as needed for moderate pain Max Daily Amount: 30 mg Do not start before April 4, 2023 , Starting Tue 4/4/2023, Normal      Rhubarb (ESTROVEN COMPLETE PO) Take by mouth 2 (two) times a week, Historical Med      ciclopirox (LOPROX) 0 77 % cream Historical Med      EPINEPHrine (EPIPEN) 0 3 mg/0 3 mL SOAJ INJECT 0 3 ML INTO THE MUSCLE ONCE, Historical Med           No discharge procedures on file  PDMP Review       Value Time User    PDMP Reviewed  Yes 3/27/2023 10:19 AM Henrry Hopper PA-C           ED Provider  Attending physically available and evaluated Smith Dany  I managed the patient along with the ED Attending      Electronically Signed by         Lynda Herr MD  04/08/23 9784

## 2023-04-07 ENCOUNTER — APPOINTMENT (EMERGENCY)
Dept: CT IMAGING | Facility: HOSPITAL | Age: 43
End: 2023-04-07

## 2023-04-07 LAB
ATRIAL RATE: 79 BPM
ATRIAL RATE: 82 BPM
BACTERIA UR CULT: ABNORMAL
P AXIS: 37 DEGREES
P AXIS: 38 DEGREES
PR INTERVAL: 132 MS
PR INTERVAL: 136 MS
QRS AXIS: 45 DEGREES
QRS AXIS: 52 DEGREES
QRSD INTERVAL: 78 MS
QRSD INTERVAL: 78 MS
QT INTERVAL: 364 MS
QT INTERVAL: 376 MS
QTC INTERVAL: 425 MS
QTC INTERVAL: 431 MS
T WAVE AXIS: 12 DEGREES
T WAVE AXIS: 15 DEGREES
VENTRICULAR RATE: 79 BPM
VENTRICULAR RATE: 82 BPM

## 2023-04-07 RX ORDER — METHOCARBAMOL 500 MG/1
500 TABLET, FILM COATED ORAL 2 TIMES DAILY
Qty: 20 TABLET | Refills: 0 | Status: SHIPPED | OUTPATIENT
Start: 2023-04-07

## 2023-04-07 RX ORDER — CEPHALEXIN 500 MG/1
500 CAPSULE ORAL EVERY 6 HOURS SCHEDULED
Qty: 40 CAPSULE | Refills: 0 | Status: SHIPPED | OUTPATIENT
Start: 2023-04-07 | End: 2023-04-17

## 2023-04-07 RX ORDER — ONDANSETRON 4 MG/1
4 TABLET, FILM COATED ORAL EVERY 6 HOURS
Qty: 12 TABLET | Refills: 0 | Status: SHIPPED | OUTPATIENT
Start: 2023-04-07

## 2023-04-07 RX ADMIN — FENTANYL CITRATE 50 MCG: 50 INJECTION, SOLUTION INTRAMUSCULAR; INTRAVENOUS at 00:30

## 2023-04-07 RX ADMIN — IOHEXOL 100 ML: 350 INJECTION, SOLUTION INTRAVENOUS at 00:52

## 2023-04-07 RX ADMIN — IOHEXOL 10 ML: 300 INJECTION, SOLUTION INTRAVENOUS at 00:52

## 2023-04-07 NOTE — DISCHARGE INSTRUCTIONS
Follow-up with PCP, surgery, and urology for further care  Contact info provided below if needed  Use Tylenol as stated on the bottle as needed for pain control  Take your new medications as prescribed  Return to the ED with new or worsening symptoms

## 2023-04-28 ENCOUNTER — TELEPHONE (OUTPATIENT)
Dept: BARIATRICS | Facility: CLINIC | Age: 43
End: 2023-04-28

## 2023-04-28 NOTE — TELEPHONE ENCOUNTER
Pt called stating the bariatric fusion Have been giving her headaches, spoke with provider advised her to try a different brand, and if still having the same outcome give a call back

## 2023-05-01 ENCOUNTER — TELEPHONE (OUTPATIENT)
Dept: BARIATRICS | Facility: CLINIC | Age: 43
End: 2023-05-01

## 2023-05-01 NOTE — TELEPHONE ENCOUNTER
Pt called stated she has a rash on her ABD just started yesterday  Spoke with provider she advised her to take Benadryl, hydrocortisone cream and Maralax incase of constipation  Call back if worsen

## 2023-05-02 ENCOUNTER — NURSE TRIAGE (OUTPATIENT)
Dept: OTHER | Facility: OTHER | Age: 43
End: 2023-05-02

## 2023-05-03 NOTE — TELEPHONE ENCOUNTER
"Regarding: Rash after surgery/ Pain  ----- Message from Thong Ferrera sent at 5/2/2023  8:01 PM EDT -----  \" I called yesterday and spoke with a nurse at Dr Felix Prabhakar office  I had surgery on 4/3 and I am having a rash on the part of the incision  Yesterday it was better than today  They told me to take Benadryl and put cream on which I did but I see that it is getting worse  and hurts the right part of the incision   \"    "

## 2023-05-03 NOTE — TELEPHONE ENCOUNTER
"  Reason for Disposition   [1] Caller has URGENT question AND [2] triager unable to answer question    Answer Assessment - Initial Assessment Questions  1  SYMPTOM: \"What's the main symptom you're concerned about? \" (e g , redness, pain, drainage)    Red bumpy rash on both sides of incision, pulling sensation in right side abdomen and seems more swollen than the left   Tingling sensation and numbness near belly button     2  ONSET: \"When did  start? \"     4/30/23    3  SURGERY: \"What surgery was performed? \"      Gastrectomy sleeve     4  DATE of SURGERY: \"When was surgery performed? \"       4/3/23    5  INCISION SITE: \"Where is the incision located?\"        6  REDNESS: \"Is there any redness at the incision site? \" If yes, ask: \"How wide across is the redness? \" (Inches, centimeters)         7  PAIN: \"Is there any pain? \" If Yes, ask: \"How bad is it? \"  (Scale 1-10; or mild, moderate, severe)     0/10 and pain 4/10, pain worse with sitting     8  BLEEDING: \"Is there any bleeding? \" If Yes, ask: \"How much? \" and \"Where? \"     Denies    9  DRAINAGE: \"Is there any drainage from the incision site? \" If yes, ask: \"What color and how much? \" (e g , red, cloudy, pus; drops, teaspoon)     Denies    10  FEVER: \"Do you have a fever? \" If Yes, ask: \"What is your temperature, how was it measured, and when did it start? \"        Denies    11  OTHER SYMPTOMS: \"Do you have any other symptoms? \" (e g , shaking chills, weakness, rash elsewhere on body)        Denies    Patient is using hydrocortisone cream and taking benadryl with no relief in the rash   Loratadine at 1530  Denies any difficulty swallowing or trouble breathing    Protocols used: POST-OP INCISION SYMPTOMS AND QUESTIONS-ADULT-    "

## 2023-05-10 ENCOUNTER — TELEPHONE (OUTPATIENT)
Dept: BARIATRICS | Facility: CLINIC | Age: 43
End: 2023-05-10

## 2023-05-10 DIAGNOSIS — Z98.84 BARIATRIC SURGERY STATUS: ICD-10-CM

## 2023-05-10 DIAGNOSIS — A04.8 H. PYLORI INFECTION: Primary | ICD-10-CM

## 2023-05-10 RX ORDER — OMEPRAZOLE 20 MG/1
20 CAPSULE, DELAYED RELEASE ORAL 2 TIMES DAILY
Qty: 28 CAPSULE | Refills: 0 | Status: SHIPPED | OUTPATIENT
Start: 2023-05-10 | End: 2023-05-24

## 2023-05-10 RX ORDER — CLARITHROMYCIN 500 MG/1
500 TABLET, COATED ORAL EVERY 12 HOURS SCHEDULED
Qty: 28 TABLET | Refills: 0 | Status: SHIPPED | OUTPATIENT
Start: 2023-05-10 | End: 2023-05-24

## 2023-05-10 RX ORDER — AMOXICILLIN 500 MG/1
1000 TABLET, FILM COATED ORAL 2 TIMES DAILY
Qty: 56 TABLET | Refills: 0 | Status: SHIPPED | OUTPATIENT
Start: 2023-05-10 | End: 2023-05-24

## 2023-05-10 NOTE — TELEPHONE ENCOUNTER
Received call from Pt re: antibiotics  Pt picked up her prescriptions for H Pylori to start today as instructed  Pt said bariatric surgeon told her she'd be taking antibiotics twice daily but the directions on the bottles say once  Pt also asked why she is taking these instead of the typical amoxicillin and clarithromycin regimen  Pt asked if her infection is worse than usual or if this is due to insurance  Sent message to bariatric fellow and will reach out to Pt with answers to her questions

## 2023-05-16 ENCOUNTER — CLINICAL SUPPORT (OUTPATIENT)
Dept: BARIATRICS | Facility: CLINIC | Age: 43
End: 2023-05-16

## 2023-05-16 DIAGNOSIS — E66.9 OBESITY, CLASS II, BMI 35-39.9: ICD-10-CM

## 2023-05-16 DIAGNOSIS — E66.01 OBESITY, CLASS III, BMI 40-49.9 (MORBID OBESITY) (HCC): Primary | ICD-10-CM

## 2023-05-16 DIAGNOSIS — Z98.84 BARIATRIC SURGERY STATUS: ICD-10-CM

## 2023-05-16 NOTE — PROGRESS NOTES
Weight Management Nutrition Class     Diagnosis: Morbid Obesity    Bariatric Surgeon: Dr Lambert Gil    Surgery: Vertical Sleeve Gastrectomy    Class: 5 week post op     Topics discussed today include:     fluid goals post op, protein goals post op, constipation, chew food well, exercise, avoidance of alcohol, PPI use, diet progression, hypoglycemia, dumping syndrome, protein supplems, vitamin/mineral supplements and calcium supplements    Patient was able to verbalize basic diet (protein, fluid, vitamin and mineral) recommendations and possible nutrition-related complications   Yes

## 2023-05-24 DIAGNOSIS — E66.01 MORBID OBESITY (HCC): ICD-10-CM

## 2023-05-24 RX ORDER — OMEPRAZOLE 20 MG/1
20 CAPSULE, DELAYED RELEASE ORAL DAILY
Qty: 90 CAPSULE | Refills: 2 | Status: SHIPPED | OUTPATIENT
Start: 2023-05-24

## 2023-06-02 ENCOUNTER — TELEPHONE (OUTPATIENT)
Dept: BARIATRICS | Facility: CLINIC | Age: 43
End: 2023-06-02

## 2023-06-02 NOTE — TELEPHONE ENCOUNTER
Patient called asking when she could start eating raw vegetables and salads  She was told per Mykel Gutierres that she could start on 05/29/2023

## 2023-06-29 ENCOUNTER — TELEPHONE (OUTPATIENT)
Dept: SLEEP CENTER | Facility: CLINIC | Age: 43
End: 2023-06-29

## 2023-06-29 NOTE — TELEPHONE ENCOUNTER
I called to ask the pt  to bring her CPAP to her [facility] on Wed 7/5, VM was full so I only had the option to send SMS with sleep dept  phone number as the call back

## 2023-07-13 ENCOUNTER — OFFICE VISIT (OUTPATIENT)
Dept: BARIATRICS | Facility: CLINIC | Age: 43
End: 2023-07-13
Payer: COMMERCIAL

## 2023-07-13 ENCOUNTER — TELEPHONE (OUTPATIENT)
Dept: BARIATRICS | Facility: CLINIC | Age: 43
End: 2023-07-13

## 2023-07-13 VITALS
HEIGHT: 62 IN | DIASTOLIC BLOOD PRESSURE: 70 MMHG | TEMPERATURE: 97.9 F | WEIGHT: 175.5 LBS | BODY MASS INDEX: 32.3 KG/M2 | SYSTOLIC BLOOD PRESSURE: 110 MMHG | HEART RATE: 72 BPM

## 2023-07-13 DIAGNOSIS — E66.9 OBESITY, CLASS I, BMI 30-34.9: ICD-10-CM

## 2023-07-13 DIAGNOSIS — G47.33 OSA (OBSTRUCTIVE SLEEP APNEA): ICD-10-CM

## 2023-07-13 DIAGNOSIS — K91.2 POSTSURGICAL MALABSORPTION: ICD-10-CM

## 2023-07-13 DIAGNOSIS — Z98.84 BARIATRIC SURGERY STATUS: ICD-10-CM

## 2023-07-13 DIAGNOSIS — Z48.815 ENCOUNTER FOR SURGICAL AFTERCARE FOLLOWING SURGERY OF DIGESTIVE SYSTEM: Primary | ICD-10-CM

## 2023-07-13 PROCEDURE — 99214 OFFICE O/P EST MOD 30 MIN: CPT | Performed by: NURSE PRACTITIONER

## 2023-07-13 RX ORDER — CLOTRIMAZOLE AND BETAMETHASONE DIPROPIONATE 10; .64 MG/G; MG/G
CREAM TOPICAL 2 TIMES DAILY
COMMUNITY
Start: 2023-05-15 | End: 2024-05-14

## 2023-07-13 NOTE — TELEPHONE ENCOUNTER
Contacted patient and reviewed informed consent. Pt provided verbal informed consent to participate in research study :  Preventing Secondary Hyperparathyroidism after primary bariatric surgery.

## 2023-07-13 NOTE — PROGRESS NOTES
Assessment/Plan:     Patient ID: Randall Clark is a 38 y.o. female. Bariatric Surgery Status    -s/p Vertical Sleeve Gastrectomy with Dr. Anival Collier on 04/03/2023. Presents to the office today for 2nd post op visit. Overall doing well, tolerating a regualt diet. Denies having any abdominal pain, N/V/D/C, regurgitation, reflux or dysphagia. Taking her MVI daily. PLAN:     - Routine follow up in 3 months for 3rd post op visit. - Continue with healthy lifestyle, adequate protein intake of 60 gm, fluid intake of at least 64 oz. - Continue with MVI daily. - Activity as tolerated. - Labs ordered and will adjust accordingly if any deficiency. - Follow up with RD and SW as needed. YURIDIA - reports having mild sleep apnea and currently unable to use her CPAP machine due to sleep disturbances. Advised to see sleep medicine for further evaluation. She may need another sleep study. · Continued/Maintain healthy weight loss with good nutrition intakes. · Adequate hydration with at least 64oz. fluid intake. · Follow diet as discussed. · Follow vitamin and mineral recommendations as reviewed with you. · Exercise as tolerated. · Colonoscopy referral made: N/A  · Mammogram - UTD    · Follow-up in 3 months for 3rd post op visit. We kindly ask that your arrive 15 minutes before your scheduled appointment time with your provider to allow our staff to room you, get your vital signs and update your chart. · Get lab work done. Please call the office if you need a script. It is recommended to check with your insurance BEFORE getting labs done to make sure they are covered by your policy. · Call our office if you have any problems with abdominal pain especially associated with fever, chills, nausea, vomiting or any other concerns. · All  Post-bariatric surgery patients should be aware that very small quantities of any alcohol can cause impairment and it is very possible not to feel the effect. The effect can be in the system for several hours. It is also a stomach irritant. · It is advised to AVOID alcohol, Nonsteroidal antiinflammatory drugs (NSAIDS) and nicotine of all forms . Any of these can cause stomach irritation/pain. · Discussed the effects of alcohol on a bariatric patient and the increased impairment risk. · Keep up the good work! Postsurgical Malabsorption   -At risk for malabsorption of vitamins/minerals secondary to malabsorption and restriction of intake from bariatric surgery  -Currently taking adequate postop bariatric surgery vitamin supplementation  -Next set of bariatric labs ordered for approximately 2 weeks  -Patient received education about the importance of adhering to a lifelong supplementation regimen to avoid vitamin/mineral deficiencies      Diagnoses and all orders for this visit:    Encounter for surgical aftercare following surgery of digestive system  -     Zinc; Future  -     Vitamin D 25 hydroxy; Future  -     Vitamin B12; Future  -     Vitamin B1, whole blood; Future  -     Vitamin A; Future  -     PTH, intact; Future  -     CBC and Platelet; Future  -     Comprehensive metabolic panel; Future  -     Ferritin; Future  -     Folate; Future  -     Iron Saturation %; Future    Bariatric surgery status  -     Zinc; Future  -     Vitamin D 25 hydroxy; Future  -     Vitamin B12; Future  -     Vitamin B1, whole blood; Future  -     Vitamin A; Future  -     PTH, intact; Future  -     CBC and Platelet; Future  -     Comprehensive metabolic panel; Future  -     Ferritin; Future  -     Folate; Future  -     Iron Saturation %; Future    Postsurgical malabsorption  -     Zinc; Future  -     Vitamin D 25 hydroxy; Future  -     Vitamin B12; Future  -     Vitamin B1, whole blood; Future  -     Vitamin A; Future  -     PTH, intact; Future  -     CBC and Platelet; Future  -     Comprehensive metabolic panel; Future  -     Ferritin; Future  -     Folate;  Future  -     Iron Saturation %; Future    Obesity, Class I, BMI 30-34.9  -     Zinc; Future  -     Vitamin D 25 hydroxy; Future  -     Vitamin B12; Future  -     Vitamin B1, whole blood; Future  -     Vitamin A; Future  -     PTH, intact; Future  -     CBC and Platelet; Future  -     Comprehensive metabolic panel; Future  -     Ferritin; Future  -     Folate; Future  -     Iron Saturation %; Future    YURIDIA (obstructive sleep apnea)    Other orders  -     clotrimazole-betamethasone (LOTRISONE) 1-0.05 % cream; Apply topically 2 (two) times a day         Subjective:      Patient ID: Berta Wright is a 38 y.o. female. -s/p Vertical Sleeve Gastrectomy with Dr. Rj Hayden on 04/03/2023. Presents to the office today for 2nd post op visit. Overall doing well, tolerating a regualt diet. Denies having any abdominal pain, N/V/D/C, regurgitation, reflux or dysphagia. Taking her MVI daily. Initial: 216 lvs  Current: 175.5 lbs  EWL: (Weight loss is ahead of schedule at this post surgical period.)  Subhash: current   Current BMI is Body mass index is 32.62 kg/m². · Tolerating a regular diet-yes  · Eating at least 60 grams of protein per day-yes  · Following 30/60 minute rule with liquids-yes  · Drinking at least 64 ounces of fluid per day-yes  · Drinking carbonated beverages-no  · Sufficient exercise-yes - biking now and walking. · Using NSAIDs regularly-no  · Using nicotine-no  · Using alcohol-no  · Supplements: Multivitamins and Calcium  + MVI does not have iron at this time. · EWL is 50%, which places the patient ahead of schedule for expected post surgical weight loss at this time. The following portions of the patient's history were reviewed and updated as appropriate: allergies, current medications, past family history, past medical history, past social history, past surgical history and problem list.    Review of Systems   Constitutional: Negative. Respiratory: Negative. Cardiovascular: Negative. Gastrointestinal: Negative. Musculoskeletal: Negative. Neurological: Negative. Psychiatric/Behavioral: Negative. Objective:    /70   Pulse 72   Temp 97.9 °F (36.6 °C) (Tympanic)   Ht 5' 1.5" (1.562 m)   Wt 79.6 kg (175 lb 8 oz)   LMP 04/01/2013 (Approximate)   BMI 32.62 kg/m²      Physical Exam  Vitals and nursing note reviewed. Constitutional:       Appearance: Normal appearance. She is obese. Cardiovascular:      Rate and Rhythm: Normal rate and regular rhythm. Pulses: Normal pulses. Heart sounds: Normal heart sounds. Pulmonary:      Effort: Pulmonary effort is normal.      Breath sounds: Normal breath sounds. Abdominal:      General: Bowel sounds are normal.      Palpations: Abdomen is soft. Tenderness: There is no abdominal tenderness. Comments: All incisions healed well without any signs of infection     Musculoskeletal:         General: Normal range of motion. Skin:     General: Skin is warm and dry. Neurological:      General: No focal deficit present. Mental Status: She is alert and oriented to person, place, and time. Psychiatric:         Mood and Affect: Mood normal.         Behavior: Behavior normal.         Thought Content:  Thought content normal.         Judgment: Judgment normal.

## 2023-07-17 ENCOUNTER — TELEPHONE (OUTPATIENT)
Dept: GYNECOLOGY | Facility: CLINIC | Age: 43
End: 2023-07-17

## 2023-07-17 NOTE — TELEPHONE ENCOUNTER
Pt called stating that she is 3 months post op from bariatric surgery and she was told to contact her GYN. She had stopped taking estrovan before her surgery and now would like to know because she is having hot flashes again and that she is losing her hair does she need an appt, do you want to order any labs or can she just start he estrovan again. Please advise.

## 2023-08-02 ENCOUNTER — OFFICE VISIT (OUTPATIENT)
Dept: GYNECOLOGY | Facility: CLINIC | Age: 43
End: 2023-08-02
Payer: COMMERCIAL

## 2023-08-02 VITALS
HEIGHT: 62 IN | BODY MASS INDEX: 31.47 KG/M2 | SYSTOLIC BLOOD PRESSURE: 130 MMHG | DIASTOLIC BLOOD PRESSURE: 84 MMHG | WEIGHT: 171 LBS

## 2023-08-02 DIAGNOSIS — N95.1 VASOMOTOR SYMPTOMS DUE TO MENOPAUSE: Primary | ICD-10-CM

## 2023-08-02 PROCEDURE — 99212 OFFICE O/P EST SF 10 MIN: CPT | Performed by: OBSTETRICS & GYNECOLOGY

## 2023-08-02 RX ORDER — CALCIUM CARBONATE 500 MG/1
3 TABLET, CHEWABLE ORAL DAILY
COMMUNITY

## 2023-08-02 NOTE — PROGRESS NOTES
Is complaining of daily hot flashes not sleeping well at night as well as night sweats. [address] in 2020 was 38. Patient had been taking over-the-counter herbal supplements for menopause symptoms with some relief. She had a gastric bypass in the spring and has lost 60 pounds. She noticed excessive hair loss. Saw her PCP who ordered blood work. Normal TSH March 2023. Status post hysterectomy for endometriosis. No pelvic pain. No dyspareunia. Impression: Menopause symptoms with hair loss. Plan: Check FSH. Continue over-the-counter her full supplements. ERT discussed. Would start on 0.5 mg estradiol first to see if symptoms resolved. Continue healthy diet and exercise.

## 2023-08-08 ENCOUNTER — APPOINTMENT (OUTPATIENT)
Dept: LAB | Facility: CLINIC | Age: 43
End: 2023-08-08
Payer: COMMERCIAL

## 2023-08-08 DIAGNOSIS — Z48.815 ENCOUNTER FOR SURGICAL AFTERCARE FOLLOWING SURGERY OF DIGESTIVE SYSTEM: ICD-10-CM

## 2023-08-08 DIAGNOSIS — Z98.84 BARIATRIC SURGERY STATUS: ICD-10-CM

## 2023-08-08 DIAGNOSIS — K91.2 POSTSURGICAL MALABSORPTION: ICD-10-CM

## 2023-08-08 DIAGNOSIS — N95.1 VASOMOTOR SYMPTOMS DUE TO MENOPAUSE: ICD-10-CM

## 2023-08-08 DIAGNOSIS — E66.9 OBESITY, CLASS I, BMI 30-34.9: ICD-10-CM

## 2023-08-08 LAB
25(OH)D3 SERPL-MCNC: 40.7 NG/ML (ref 30–100)
ALBUMIN SERPL BCP-MCNC: 3.9 G/DL (ref 3.5–5)
ALP SERPL-CCNC: 54 U/L (ref 46–116)
ALT SERPL W P-5'-P-CCNC: 45 U/L (ref 12–78)
ANION GAP SERPL CALCULATED.3IONS-SCNC: 6 MMOL/L
AST SERPL W P-5'-P-CCNC: 20 U/L (ref 5–45)
BILIRUB SERPL-MCNC: 0.41 MG/DL (ref 0.2–1)
BUN SERPL-MCNC: 11 MG/DL (ref 5–25)
CALCIUM SERPL-MCNC: 9.5 MG/DL (ref 8.3–10.1)
CHLORIDE SERPL-SCNC: 109 MMOL/L (ref 96–108)
CO2 SERPL-SCNC: 28 MMOL/L (ref 21–32)
CREAT SERPL-MCNC: 0.76 MG/DL (ref 0.6–1.3)
ERYTHROCYTE [DISTWIDTH] IN BLOOD BY AUTOMATED COUNT: 11.8 % (ref 11.6–15.1)
FERRITIN SERPL-MCNC: 166 NG/ML (ref 11–307)
GFR SERPL CREATININE-BSD FRML MDRD: 95 ML/MIN/1.73SQ M
GLUCOSE P FAST SERPL-MCNC: 98 MG/DL (ref 65–99)
HCT VFR BLD AUTO: 41.1 % (ref 34.8–46.1)
HGB BLD-MCNC: 13.6 G/DL (ref 11.5–15.4)
IRON SATN MFR SERPL: 34 % (ref 15–50)
IRON SERPL-MCNC: 108 UG/DL (ref 50–170)
MCH RBC QN AUTO: 30.4 PG (ref 26.8–34.3)
MCHC RBC AUTO-ENTMCNC: 33.1 G/DL (ref 31.4–37.4)
MCV RBC AUTO: 92 FL (ref 82–98)
PLATELET # BLD AUTO: 354 THOUSANDS/UL (ref 149–390)
PMV BLD AUTO: 9.3 FL (ref 8.9–12.7)
POTASSIUM SERPL-SCNC: 3.8 MMOL/L (ref 3.5–5.3)
PROT SERPL-MCNC: 7.6 G/DL (ref 6.4–8.4)
PTH-INTACT SERPL-MCNC: 25.8 PG/ML (ref 12–88)
RBC # BLD AUTO: 4.48 MILLION/UL (ref 3.81–5.12)
SODIUM SERPL-SCNC: 143 MMOL/L (ref 135–147)
TIBC SERPL-MCNC: 316 UG/DL (ref 250–450)
VIT B12 SERPL-MCNC: 665 PG/ML (ref 180–914)
WBC # BLD AUTO: 7.94 THOUSAND/UL (ref 4.31–10.16)

## 2023-08-08 PROCEDURE — 36415 COLL VENOUS BLD VENIPUNCTURE: CPT

## 2023-08-08 PROCEDURE — 82607 VITAMIN B-12: CPT

## 2023-08-08 PROCEDURE — 80053 COMPREHEN METABOLIC PANEL: CPT

## 2023-08-08 PROCEDURE — 85027 COMPLETE CBC AUTOMATED: CPT

## 2023-08-08 PROCEDURE — 84425 ASSAY OF VITAMIN B-1: CPT

## 2023-08-08 PROCEDURE — 84630 ASSAY OF ZINC: CPT

## 2023-08-08 PROCEDURE — 82306 VITAMIN D 25 HYDROXY: CPT

## 2023-08-08 PROCEDURE — 83970 ASSAY OF PARATHORMONE: CPT

## 2023-08-08 PROCEDURE — 82746 ASSAY OF FOLIC ACID SERUM: CPT

## 2023-08-08 PROCEDURE — 84590 ASSAY OF VITAMIN A: CPT

## 2023-08-08 PROCEDURE — 83001 ASSAY OF GONADOTROPIN (FSH): CPT

## 2023-08-08 PROCEDURE — 83540 ASSAY OF IRON: CPT

## 2023-08-08 PROCEDURE — 82728 ASSAY OF FERRITIN: CPT

## 2023-08-08 PROCEDURE — 83550 IRON BINDING TEST: CPT

## 2023-08-09 LAB
FOLATE SERPL-MCNC: >22.3 NG/ML
FSH SERPL-ACNC: 112.4 MIU/ML

## 2023-08-14 LAB
VIT B1 BLD-SCNC: 120.7 NMOL/L (ref 66.5–200)
ZINC SERPL-MCNC: 80 UG/DL (ref 44–115)

## 2023-08-15 LAB — VIT A SERPL-MCNC: 42.3 UG/DL (ref 20.1–62)

## 2023-08-16 ENCOUNTER — TELEPHONE (OUTPATIENT)
Dept: BARIATRICS | Facility: CLINIC | Age: 43
End: 2023-08-16

## 2023-08-16 NOTE — TELEPHONE ENCOUNTER
Called pt to review lab results, Left VM        ----- Message from Claudia Click sent at 8/16/2023  8:38 AM EDT -----  Please call pt : Our office received your most recent labs results. Your levels are within acceptable limits. Continue with her supplements as is.      Everardo Lal

## 2023-08-17 ENCOUNTER — TELEPHONE (OUTPATIENT)
Dept: BARIATRICS | Facility: CLINIC | Age: 43
End: 2023-08-17

## 2023-08-17 NOTE — TELEPHONE ENCOUNTER
Received call from Pt re: vitamins. Reviewed with Pt lab results and CRNP recommendations. Spoke with CRNP and JENNA re: vitamins. Advised Pt she needs to continue her vitamins with iron life-long and could try biotin/ collagen for her hair loss. Explained to Pt hair loss after bariatric surgery is common and should level out around 9 months. Pt verbalized understanding and was agreeable to plan.

## 2023-10-20 ENCOUNTER — OFFICE VISIT (OUTPATIENT)
Dept: BARIATRICS | Facility: CLINIC | Age: 43
End: 2023-10-20

## 2023-10-20 VITALS
DIASTOLIC BLOOD PRESSURE: 67 MMHG | HEIGHT: 62 IN | TEMPERATURE: 97.5 F | BODY MASS INDEX: 30.36 KG/M2 | WEIGHT: 165 LBS | SYSTOLIC BLOOD PRESSURE: 118 MMHG | HEART RATE: 78 BPM

## 2023-10-20 DIAGNOSIS — K91.2 POSTSURGICAL MALABSORPTION: ICD-10-CM

## 2023-10-20 DIAGNOSIS — Z48.815 ENCOUNTER FOR SURGICAL AFTERCARE FOLLOWING SURGERY OF DIGESTIVE SYSTEM: Primary | ICD-10-CM

## 2023-10-20 DIAGNOSIS — K21.9 GERD (GASTROESOPHAGEAL REFLUX DISEASE): ICD-10-CM

## 2023-10-20 DIAGNOSIS — Z98.84 BARIATRIC SURGERY STATUS: ICD-10-CM

## 2023-10-20 DIAGNOSIS — I10 ESSENTIAL HYPERTENSION: ICD-10-CM

## 2023-10-20 NOTE — PROGRESS NOTES
Assessment/Plan:     Patient ID: Raine Johnson is a 38 y.o. female. Bariatric Surgery Status/GERD  -s/p Vertical Sleeve Gastrectomy with Dr. Yadira Garcia on 04/03/2023. Presents to the office today for 3rd post op visit. Overall doing well, tolerating a regular diet. Denies having any abdominal pain, N/V/D/C, regurgitation, reflux or dysphagia. Taking her MVI daily and PPI daily. PLAN:      - Taper off omeprazole over the next 6 weeks. - Routine follow up in 6 months for annual post op visit. - Continue with healthy lifestyle, adequate protein intake of 60 gm, fluid intake of at least 64 oz. - Continue with MVI daily. - Activity as tolerated. - Labs ordered and will adjust accordingly if any deficiency. - Follow up with RD and SW as needed. HTN - BP within limits in office. Off all BP meds at this time. Continue to monitor for now. Follow up with PCP as scheduled. Continued/Maintain healthy weight loss with good nutrition intakes. Adequate hydration with at least 64oz. fluid intake. Follow diet as discussed. Follow vitamin and mineral recommendations as reviewed with you. Exercise as tolerated. Colonoscopy referral made: N/A  Mammogram - UTD    Follow-up in 6 months for annual visit. We kindly ask that your arrive 15 minutes before your scheduled appointment time with your provider to allow our staff to room you, get your vital signs and update your chart. Get lab work done. Please call the office if you need a script. It is recommended to check with your insurance BEFORE getting labs done to make sure they are covered by your policy. Call our office if you have any problems with abdominal pain especially associated with fever, chills, nausea, vomiting or any other concerns. All  Post-bariatric surgery patients should be aware that very small quantities of any alcohol can cause impairment and it is very possible not to feel the effect.  The effect can be in the system for several hours. It is also a stomach irritant. It is advised to AVOID alcohol, Nonsteroidal antiinflammatory drugs (NSAIDS) and nicotine of all forms . Any of these can cause stomach irritation/pain. Discussed the effects of alcohol on a bariatric patient and the increased impairment risk. Keep up the good work! Postsurgical Malabsorption   -At risk for malabsorption of vitamins/minerals secondary to malabsorption and restriction of intake from bariatric surgery  - Currently taking adequate postop bariatric surgery vitamin supplementation  -Last set of bariatric labs completed on 08/2023 and showed WNL  -Patient received education about the importance of adhering to a lifelong supplementation regimen to avoid vitamin/mineral deficiencies      Diagnoses and all orders for this visit:    Encounter for surgical aftercare following surgery of digestive system    Bariatric surgery status    Postsurgical malabsorption    GERD (gastroesophageal reflux disease)    Essential hypertension         Subjective:      Patient ID: Jeanne Montalvo is a 38 y.o. female. -s/p Vertical Sleeve Gastrectomy with Dr. Lisa Gorman on 04/03/2023. Presents to the office today for 3rd post op visit. Overall doing well, tolerating a regular diet. Denies having any abdominal pain, N/V/D/C, regurgitation, reflux or dysphagia. Taking her MVI daily and PPI daily. .     Initial: 216 lbs  Current: 165 lbs  EWL: (Weight loss is ahead of schedule at this post surgical period.)  Subhash: current   Current BMI is Body mass index is 30.67 kg/m². Tolerating a regular diet-yes  Eating at least 60 grams of protein per day-yes  Following 30/60 minute rule with liquids-yes  Drinking at least 64 ounces of fluid per day-yes  Drinking carbonated beverages-no  Sufficient exercise-yes - walking  Using NSAIDs regularly-no  Using nicotine-no  Using alcohol-no  Supplements:  Multivitamins and Calcium  + MVI does not have iron at this time. EWL is 62%, which places the patient ahead of schedule for expected post surgical weight loss at this time. The following portions of the patient's history were reviewed and updated as appropriate: allergies, current medications, past family history, past medical history, past social history, past surgical history and problem list.    Review of Systems   Constitutional: Negative. Respiratory: Negative. Cardiovascular: Negative. Gastrointestinal: Negative. Musculoskeletal: Negative. Neurological: Negative. Psychiatric/Behavioral: Negative. Objective:    /67   Pulse 78   Temp 97.5 °F (36.4 °C) (Tympanic)   Ht 5' 1.5" (1.562 m)   Wt 74.8 kg (165 lb)   LMP 04/01/2013 (Approximate)   BMI 30.67 kg/m²      Physical Exam  Vitals and nursing note reviewed. Constitutional:       Appearance: Normal appearance. Cardiovascular:      Rate and Rhythm: Normal rate and regular rhythm. Pulses: Normal pulses. Heart sounds: Normal heart sounds. Pulmonary:      Effort: Pulmonary effort is normal.      Breath sounds: Normal breath sounds. Abdominal:      General: Bowel sounds are normal.      Palpations: Abdomen is soft. Tenderness: There is no abdominal tenderness. Musculoskeletal:         General: Normal range of motion. Skin:     General: Skin is warm and dry. Neurological:      General: No focal deficit present. Mental Status: She is alert and oriented to person, place, and time. Psychiatric:         Mood and Affect: Mood normal.         Behavior: Behavior normal.         Thought Content:  Thought content normal.         Judgment: Judgment normal.

## 2024-04-08 ENCOUNTER — OFFICE VISIT (OUTPATIENT)
Dept: BARIATRICS | Facility: CLINIC | Age: 44
End: 2024-04-08
Payer: COMMERCIAL

## 2024-04-08 VITALS
WEIGHT: 177.5 LBS | BODY MASS INDEX: 33 KG/M2 | SYSTOLIC BLOOD PRESSURE: 114 MMHG | HEART RATE: 77 BPM | TEMPERATURE: 97.6 F | DIASTOLIC BLOOD PRESSURE: 77 MMHG

## 2024-04-08 DIAGNOSIS — G47.33 OSA (OBSTRUCTIVE SLEEP APNEA): ICD-10-CM

## 2024-04-08 DIAGNOSIS — Z48.815 ENCOUNTER FOR SURGICAL AFTERCARE FOLLOWING SURGERY OF DIGESTIVE SYSTEM: Primary | ICD-10-CM

## 2024-04-08 DIAGNOSIS — K91.2 POSTSURGICAL MALABSORPTION: ICD-10-CM

## 2024-04-08 DIAGNOSIS — E66.9 OBESITY, CLASS I, BMI 30-34.9: ICD-10-CM

## 2024-04-08 DIAGNOSIS — Z12.11 COLON CANCER SCREENING: ICD-10-CM

## 2024-04-08 DIAGNOSIS — Z98.84 BARIATRIC SURGERY STATUS: ICD-10-CM

## 2024-04-08 PROCEDURE — 99214 OFFICE O/P EST MOD 30 MIN: CPT | Performed by: NURSE PRACTITIONER

## 2024-04-08 NOTE — PROGRESS NOTES
Assessment/Plan:     Patient ID: Meagan Carbone is a 43 y.o. female.     Bariatric Surgery Status    -s/p Vertical Sleeve Gastrectomy with Dr. Ly on 04/03/2023. Presents to the office today for annual visit. Overall doing well, tolerating a regular diet. Denies having any abdominal pain, N/V/D/C, regurgitation, reflux or dysphagia. Taking her MVI daily     PLAN:     - Routine follow up in 6 months for annual post op visit.   - Continue with healthy lifestyle, adequate protein intake of 60 gm, fluid intake of at least 64 oz.   - Continue with MVI daily.   - Activity as tolerated.   - Labs ordered and will adjust accordingly if any deficiency.   - Follow up with RD and SW as needed.     YURIDIA - not currently on CPAP; will be seeing her sleep medicine to do repeat sleep study. Recommend to f/u to monitor for resolution of YURIDIA.     Continued/Maintain healthy weight loss with good nutrition intakes.  Adequate hydration with at least 64oz. fluid intake.  Follow diet as discussed.  Follow vitamin and mineral recommendations as reviewed with you.  Exercise as tolerated.    Colonoscopy referral made: due - referral to GI.  Mammogram - UTD    Follow-up in 6 MONTHS for annual visit. We kindly ask that your arrive 15 minutes before your scheduled appointment time with your provider to allow our staff to room you, get your vital signs and update your chart.    Get lab work done prior to visit. Please call the office if you need a script.  It is recommended to check with your insurance BEFORE getting labs done to make sure they are covered by your policy.      Call our office if you have any problems with abdominal pain especially associated with fever, chills, nausea, vomiting or any other concerns.    All  Post-bariatric surgery patients should be aware that very small quantities of any alcohol can cause impairment and it is very possible not to feel the effect. The effect can be in the system for several hours.  It is  also a stomach irritant.     It is advised to AVOID alcohol, Nonsteroidal antiinflammatory drugs (NSAIDS) and nicotine of all forms . Any of these can cause stomach irritation/pain.    Discussed the effects of alcohol on a bariatric patient and the increased impairment risk.     Keep up the good work!     Postsurgical Malabsorption   -At risk for malabsorption of vitamins/minerals secondary to malabsorption and restriction of intake from bariatric surgery  -Currently taking adequate postop bariatric surgery vitamin supplementation  -Last set of bariatric labs completed on 08/08/2023 and showed WNL   -Next set of bariatric labs ordered for approximately 2 weeks  -Patient received education about the importance of adhering to a lifelong supplementation regimen to avoid vitamin/mineral deficiencies      Diagnoses and all orders for this visit:    Encounter for surgical aftercare following surgery of digestive system  -     CBC; Future  -     Comprehensive metabolic panel; Future  -     Folate; Future  -     Iron Panel (Includes Ferritin, Iron Sat%, Iron, and TIBC); Future  -     PTH, intact; Future  -     Vitamin A; Future  -     Vitamin B1, whole blood; Future  -     Vitamin B12; Future  -     Vitamin D 25 hydroxy; Future  -     Zinc; Future    Bariatric surgery status  -     CBC; Future  -     Comprehensive metabolic panel; Future  -     Folate; Future  -     Iron Panel (Includes Ferritin, Iron Sat%, Iron, and TIBC); Future  -     PTH, intact; Future  -     Vitamin A; Future  -     Vitamin B1, whole blood; Future  -     Vitamin B12; Future  -     Vitamin D 25 hydroxy; Future  -     Zinc; Future  -     Ambulatory referral to Gastroenterology; Future    Postsurgical malabsorption  -     CBC; Future  -     Comprehensive metabolic panel; Future  -     Folate; Future  -     Iron Panel (Includes Ferritin, Iron Sat%, Iron, and TIBC); Future  -     PTH, intact; Future  -     Vitamin A; Future  -     Vitamin B1, whole blood;  Future  -     Vitamin B12; Future  -     Vitamin D 25 hydroxy; Future  -     Zinc; Future    BMI 33.0-33.9,adult  -     CBC; Future  -     Comprehensive metabolic panel; Future  -     Folate; Future  -     Iron Panel (Includes Ferritin, Iron Sat%, Iron, and TIBC); Future  -     PTH, intact; Future  -     Vitamin A; Future  -     Vitamin B1, whole blood; Future  -     Vitamin B12; Future  -     Vitamin D 25 hydroxy; Future  -     Zinc; Future    Obesity, Class I, BMI 30-34.9  -     CBC; Future  -     Comprehensive metabolic panel; Future  -     Folate; Future  -     Iron Panel (Includes Ferritin, Iron Sat%, Iron, and TIBC); Future  -     PTH, intact; Future  -     Vitamin A; Future  -     Vitamin B1, whole blood; Future  -     Vitamin B12; Future  -     Vitamin D 25 hydroxy; Future  -     Zinc; Future    YURIDIA (obstructive sleep apnea)  -     CBC; Future  -     Comprehensive metabolic panel; Future  -     Folate; Future  -     Iron Panel (Includes Ferritin, Iron Sat%, Iron, and TIBC); Future  -     PTH, intact; Future  -     Vitamin A; Future  -     Vitamin B1, whole blood; Future  -     Vitamin B12; Future  -     Vitamin D 25 hydroxy; Future  -     Zinc; Future    Colon cancer screening  -     Ambulatory referral to Gastroenterology; Future         Subjective:      Patient ID: Meagan Carbone is a 43 y.o. female.      -s/p Vertical Sleeve Gastrectomy with Dr. Ly on 04/03/2023. Presents to the office today for annual visit. Overall doing well, tolerating a regular diet. Denies having any abdominal pain, N/V/D/C, regurgitation, reflux or dysphagia. Taking her MVI daily       Initial: 216 lbs  Current: 177.5 lbs   EWL: (Weight loss is ahead of schedule at this post surgical period.)  Subhash: 150s  Current BMI is Body mass index is 33 kg/m².    Tolerating a regular diet-yes  Eating at least 60 grams of protein per day-yes  Following 30/60 minute rule with liquids-yes  Drinking at least 64 ounces of fluid per  day-yes  Drinking carbonated beverages-no  Sufficient exercise-yes - walking and goes to the gym .   Using NSAIDs regularly-no  Using nicotine-no  Using alcohol-no  Supplements: Bariatric Multivitamins, Calcium , and - multi contains iron.      EWL is 47%, which places the patient ahead of schedule for expected post surgical weight loss at this time.     The following portions of the patient's history were reviewed and updated as appropriate: allergies, current medications, past family history, past medical history, past social history, past surgical history and problem list.    Review of Systems   Constitutional: Negative.    Respiratory: Negative.     Cardiovascular: Negative.    Gastrointestinal: Negative.    Musculoskeletal: Negative.    Neurological:  Positive for numbness (tingling of right hand.).   Psychiatric/Behavioral: Negative.           Objective:    /77   Pulse 77   Temp 97.6 °F (36.4 °C) (Tympanic)   Wt 80.5 kg (177 lb 8 oz)   LMP 04/01/2013 (Approximate)   BMI 33.00 kg/m²      Physical Exam  Vitals and nursing note reviewed.   Constitutional:       Appearance: Normal appearance. She is obese.   Cardiovascular:      Rate and Rhythm: Normal rate and regular rhythm.      Pulses: Normal pulses.      Heart sounds: Normal heart sounds.   Pulmonary:      Effort: Pulmonary effort is normal.      Breath sounds: Normal breath sounds.   Abdominal:      General: Bowel sounds are normal. There is no distension.      Palpations: Abdomen is soft.      Tenderness: There is no abdominal tenderness.   Musculoskeletal:         General: Normal range of motion.   Skin:     General: Skin is warm and dry.   Neurological:      General: No focal deficit present.      Mental Status: She is alert and oriented to person, place, and time.   Psychiatric:         Mood and Affect: Mood normal.         Behavior: Behavior normal.         Thought Content: Thought content normal.         Judgment: Judgment normal.

## 2024-04-11 ENCOUNTER — TELEPHONE (OUTPATIENT)
Age: 44
End: 2024-04-11

## 2024-04-11 NOTE — TELEPHONE ENCOUNTER
Scheduled date of colonoscopy (as of today):  07.26.2024    Physician performing colonoscopy:  Dr. Wells     Location of colonoscopy:  [facility]    Bowel prep reviewed with patient:  Miralax/Dulcolax

## 2024-04-11 NOTE — TELEPHONE ENCOUNTER
COLONOSCOPY  MIRALAX/Dulcolax Bowel Preparation Instructions    The OR/GI Lab will contact you the evening prior to your procedure with your exact arrival time.    Our practice requires a 1 week notice for any cancellations or rescheduling. We kindly ask that you immediately notify us of any changes including any new medications that are prescribed. Thank you for your cooperation.     WEEK BEFORE YOUR PROCEDURE:  Stop taking Iron tablets.  5 days prior, AVOID vegetables and fruits with skins or seeds, nuts, corn, popcorn and whole grain breads.   Purchase: One (1) 238-gram container of Miralax (polyethylene glycol 3350), four (4) 5 mg Dulcolax (bisacodyl) tablets, and one (1) 64-ounce bottle of Gatorade (sports drink) - no red, orange, or purple. These may be purchased at any pharmacy without a prescription. Generic products are permissible.   Arrange responsible transportation for day of the procedure.     DAY BEFORE THE PROCEDURE:   CLEAR liquids only for entire day prior. Nothing red, orange or purple.    You MAY have:                                                               Soda  Water  Broth Gatorade  Jello  Popsicles Coffee/tea without milk/creamer     YOU MAY NOT HAVE:  Solid foods   Milk and milk products    Juice with pulp    BOWEL PREPARATION:  Includes: One (1) 238-gram container of Miralax (polyethylene glycol 3350), four (4) 5 mg Dulcolax (bisacodyl) tablets, and one (1) 64-ounce bottle of Gatorade (sports drink).  Preparation may be refrigerated.  Entire bowel prep should be completed.     Afternoon before the procedure (2:00 pm - 5:00 pm):    Take two (2) 5 mg Dulcolax laxative tablets.     Evening before the procedure (6:00 pm):  Mix entire container of Miralax with one (1) 64-ounce bottle of Gatorade and shake until all medication is dissolved.   Begin drinking solution. Drink an eight (8) ounce cup every 10-15 minutes until you have consumed half (32 ounces) of the solution.  Refrigerate  remaining solution.    Night before the procedure (8:00 pm):  Take two (2) 5 mg Dulcolax laxative tablets.     Beginning 5 hours before your procedure:  Drink the remaining amount of prepared solution (32 ounces).  Drink an eight (8) ounce cup every 10-15 minutes until you have consumed the remaining solution.     Bowel prep should be completed 4 hours prior to procedure time.    NOTHING TO EAT OR DRINK AFTER MIDNIGHT- EXCEPT FOR YOUR PREP    DAY OF THE PROCEDURE:  You may brush your teeth.  Leave all jewelry at home.  Please arrive for your procedure as indicated by the OR / GI Lab / Endoscopy Unit. The hospital will contact you the day before with your exact arrival time.   Make sure you have arranged ahead of time for a responsible adult (18 or older) to accompany and drive you home after the procedure.  Please discuss any transportation concerns with our staff prior to your procedure.    The effects of the anesthesia can persist for 24 hours.  After receiving the sedation, you must exercise caution before engaging in any activity that could harm yourself and others (such as driving a car).  Do not make any important decisions or do not drink any alcoholic beverages during this time period.  After your procedure, you may have anything you'd like to eat or drink.  You will probably want to start with something light.  Please include plenty of fluids.  Avoid items that cause gas such as sodas and salads.    SPECIAL INSTRUCTIONS:    For patients currently taking blood thinners and/or antiplatelet therapy our office will contact the prescribing provider.  Our office will contact you with any required changes to your medication regimen.     Blood thinner (i.e. - Coumadin, Pradaxa, Lovenox, Xarelto, Eliquis)  ?  Continue (Do Not Stop)  ? Stop______________for_____________days prior to the procedure.    Antiplatelet (i.e. - Plavix, Aggrenox, Effient, Brilinta)  ?  Continue (Do Not  Stop)  ? Stop______________for_____________days prior to the procedure.       Diabetes:   If you are Diabetic, please see separate Diabetic Instruction Sheet.          Prescribed medications:  Do not stop your aspirin, or any of your other medications (unless instructed otherwise).    Take the rest of your prescribed medications with small sips of water at least 2 hours prior to your procedure.      For any questions or concerns related to your bowel preparation or pre-procedure instructions, please contact our office at [phone].  Thank you for choosing [facility]!

## 2024-07-12 ENCOUNTER — ANESTHESIA EVENT (OUTPATIENT)
Dept: ANESTHESIOLOGY | Facility: HOSPITAL | Age: 44
End: 2024-07-12

## 2024-07-12 ENCOUNTER — ANESTHESIA (OUTPATIENT)
Dept: ANESTHESIOLOGY | Facility: HOSPITAL | Age: 44
End: 2024-07-12

## 2024-07-23 ENCOUNTER — PATIENT MESSAGE (OUTPATIENT)
Age: 44
End: 2024-07-23

## 2024-07-25 RX ORDER — SODIUM CHLORIDE, SODIUM LACTATE, POTASSIUM CHLORIDE, CALCIUM CHLORIDE 600; 310; 30; 20 MG/100ML; MG/100ML; MG/100ML; MG/100ML
75 INJECTION, SOLUTION INTRAVENOUS CONTINUOUS
Status: CANCELLED | OUTPATIENT
Start: 2024-07-25

## 2024-07-26 ENCOUNTER — HOSPITAL ENCOUNTER (OUTPATIENT)
Dept: GASTROENTEROLOGY | Facility: AMBULARY SURGERY CENTER | Age: 44
Setting detail: OUTPATIENT SURGERY
End: 2024-07-26
Attending: INTERNAL MEDICINE
Payer: COMMERCIAL

## 2024-07-26 ENCOUNTER — ANESTHESIA (OUTPATIENT)
Dept: GASTROENTEROLOGY | Facility: AMBULARY SURGERY CENTER | Age: 44
End: 2024-07-26

## 2024-07-26 ENCOUNTER — ANESTHESIA EVENT (OUTPATIENT)
Dept: GASTROENTEROLOGY | Facility: AMBULARY SURGERY CENTER | Age: 44
End: 2024-07-26

## 2024-07-26 VITALS
OXYGEN SATURATION: 99 % | BODY MASS INDEX: 32.57 KG/M2 | WEIGHT: 177 LBS | SYSTOLIC BLOOD PRESSURE: 111 MMHG | HEART RATE: 77 BPM | RESPIRATION RATE: 16 BRPM | DIASTOLIC BLOOD PRESSURE: 63 MMHG | TEMPERATURE: 97 F | HEIGHT: 62 IN

## 2024-07-26 DIAGNOSIS — Z12.11 SCREENING FOR COLON CANCER: ICD-10-CM

## 2024-07-26 PROBLEM — Z98.84 H/O BARIATRIC SURGERY: Status: ACTIVE | Noted: 2024-07-26

## 2024-07-26 PROBLEM — I10 ESSENTIAL HYPERTENSION: Status: RESOLVED | Noted: 2022-05-18 | Resolved: 2024-07-26

## 2024-07-26 PROBLEM — Z90.710 HISTORY OF HYSTERECTOMY: Status: ACTIVE | Noted: 2024-07-26

## 2024-07-26 PROCEDURE — G0121 COLON CA SCRN NOT HI RSK IND: HCPCS | Performed by: INTERNAL MEDICINE

## 2024-07-26 RX ORDER — PROPOFOL 10 MG/ML
INJECTION, EMULSION INTRAVENOUS AS NEEDED
Status: DISCONTINUED | OUTPATIENT
Start: 2024-07-26 | End: 2024-07-26

## 2024-07-26 RX ORDER — SODIUM CHLORIDE, SODIUM LACTATE, POTASSIUM CHLORIDE, CALCIUM CHLORIDE 600; 310; 30; 20 MG/100ML; MG/100ML; MG/100ML; MG/100ML
INJECTION, SOLUTION INTRAVENOUS CONTINUOUS PRN
Status: DISCONTINUED | OUTPATIENT
Start: 2024-07-26 | End: 2024-07-26

## 2024-07-26 RX ORDER — LIDOCAINE HYDROCHLORIDE 10 MG/ML
INJECTION, SOLUTION EPIDURAL; INFILTRATION; INTRACAUDAL; PERINEURAL AS NEEDED
Status: DISCONTINUED | OUTPATIENT
Start: 2024-07-26 | End: 2024-07-26

## 2024-07-26 RX ORDER — SODIUM CHLORIDE, SODIUM LACTATE, POTASSIUM CHLORIDE, CALCIUM CHLORIDE 600; 310; 30; 20 MG/100ML; MG/100ML; MG/100ML; MG/100ML
75 INJECTION, SOLUTION INTRAVENOUS CONTINUOUS
Status: DISCONTINUED | OUTPATIENT
Start: 2024-07-26 | End: 2024-07-30 | Stop reason: HOSPADM

## 2024-07-26 RX ADMIN — PROPOFOL 25 MG: 10 INJECTION, EMULSION INTRAVENOUS at 14:07

## 2024-07-26 RX ADMIN — PROPOFOL 25 MG: 10 INJECTION, EMULSION INTRAVENOUS at 14:05

## 2024-07-26 RX ADMIN — PROPOFOL 25 MG: 10 INJECTION, EMULSION INTRAVENOUS at 14:04

## 2024-07-26 RX ADMIN — PROPOFOL 50 MG: 10 INJECTION, EMULSION INTRAVENOUS at 14:06

## 2024-07-26 RX ADMIN — PROPOFOL 25 MG: 10 INJECTION, EMULSION INTRAVENOUS at 14:08

## 2024-07-26 RX ADMIN — SIMETHICONE 30 MG: 20 SUSPENSION/ DROPS ORAL at 14:06

## 2024-07-26 RX ADMIN — LIDOCAINE HYDROCHLORIDE 100 MG: 10 INJECTION, SOLUTION EPIDURAL; INFILTRATION; INTRACAUDAL at 14:02

## 2024-07-26 RX ADMIN — PROPOFOL 50 MG: 10 INJECTION, EMULSION INTRAVENOUS at 14:03

## 2024-07-26 RX ADMIN — PROPOFOL 25 MG: 10 INJECTION, EMULSION INTRAVENOUS at 14:10

## 2024-07-26 RX ADMIN — PROPOFOL 25 MG: 10 INJECTION, EMULSION INTRAVENOUS at 14:14

## 2024-07-26 RX ADMIN — PROPOFOL 50 MG: 10 INJECTION, EMULSION INTRAVENOUS at 14:09

## 2024-07-26 RX ADMIN — PROPOFOL 25 MG: 10 INJECTION, EMULSION INTRAVENOUS at 14:12

## 2024-07-26 RX ADMIN — SODIUM CHLORIDE, SODIUM LACTATE, POTASSIUM CHLORIDE, AND CALCIUM CHLORIDE: .6; .31; .03; .02 INJECTION, SOLUTION INTRAVENOUS at 13:56

## 2024-07-26 RX ADMIN — PROPOFOL 100 MG: 10 INJECTION, EMULSION INTRAVENOUS at 14:02

## 2024-07-26 NOTE — ANESTHESIA PREPROCEDURE EVALUATION
Procedure:  COLONOSCOPY    Relevant Problems   ANESTHESIA   (+) PONV (postoperative nausea and vomiting)      CARDIO   (+) Essential hypertension (Resolved)   (+) Mixed hyperlipidemia (No meds)      GI/HEPATIC   (+) GERD (gastroesophageal reflux disease)   (+) H/O bariatric surgery      GYN   (+) History of hysterectomy      PULMONARY   (+) YURIDIA (obstructive sleep apnea)        Physical Exam    Airway    Mallampati score: II  TM Distance: >3 FB  Neck ROM: full     Dental       Cardiovascular  Rhythm: regular, Rate: normal    Pulmonary   Breath sounds clear to auscultation    Other Findings  post-pubertal.      Anesthesia Plan  ASA Score- 2     Anesthesia Type- IV sedation with anesthesia with ASA Monitors.         Additional Monitors:     Airway Plan:            Plan Factors-    Chart reviewed.        Patient is not a current smoker.              Induction- intravenous.    Postoperative Plan-     Perioperative Resuscitation Plan - Level 1 - Full Code.       Informed Consent- Anesthetic plan and risks discussed with patient.  I personally reviewed this patient with the CRNA. Discussed and agreed on the Anesthesia Plan with the CRNA..

## 2024-07-26 NOTE — ANESTHESIA POSTPROCEDURE EVALUATION
Post-Op Assessment Note    CV Status:  Stable    Pain management: adequate       Mental Status:  Sleepy and lethargic   Hydration Status:  Stable   PONV Controlled:  None   Airway Patency:  Patent     Post Op Vitals Reviewed: Yes    No anethesia notable event occurred.    Staff: CRNA               BP 97/59 (07/26/24 1418)    Temp (!) 97 °F (36.1 °C) (07/26/24 1418)    Pulse 79 (07/26/24 1418)   Resp 16 (07/26/24 1418)    SpO2 97 % (07/26/24 1418)

## 2024-07-26 NOTE — H&P
History and Physical - [facility]  Meagan Carbone 43 y.o. female MRN: [id number]    HPI: Meagan Carbone is a 43 y.o. year old female who presents for colon cancer screening evaluation.      Review of Systems    Historical Information   Past Medical History:   Diagnosis Date    Allergies     COVID 07/18/2022    Endometriosis     Frequent sinus infections     spring time    GERD (gastroesophageal reflux disease)     Hypertension     PONV (postoperative nausea and vomiting)      Past Surgical History:   Procedure Laterality Date    CHOLECYSTECTOMY  2014    EGD      HYSTERECTOMY  2014    OOPHORECTOMY Left 2014    NM LAPS GSTRC RSTRICTIV PX LONGITUDINAL GASTRECTOMY N/A 4/3/2023    Procedure: GASTRECTOMY SLEEVE LAP & INTRAOPERATIVE EGD;  Surgeon: Joseph Ly MD;  Location: [facility];  Service: Bariatrics     Social History   Social History     Substance and Sexual Activity   Alcohol Use Not Currently    Comment: rarely     Social History     Substance and Sexual Activity   Drug Use Never     Social History     Tobacco Use   Smoking Status Never   Smokeless Tobacco Never     Family History   Problem Relation Age of Onset    Hypertension Mother     Ovarian cancer Mother 43    Hyperlipidemia Mother     Hyperthyroidism Mother     Hyperlipidemia Father     Heart disease Father     Hypertension Father     Skin cancer Father     Stroke Father     No Known Problems Brother     No Known Problems Daughter     No Known Problems Son     Diabetes Maternal Aunt     Ovarian cancer Maternal Aunt     Diabetes Paternal Aunt     No Known Problems Maternal Grandmother     No Known Problems Maternal Grandfather     No Known Problems Paternal Grandmother     Heart disease Paternal Grandfather        Meds/Allergies     Not in a hospital admission.    Allergies   Allergen Reactions    Medical Tape Rash    Terbinafine Swelling     Tongue swelling; not 100% positive    Wound Dressing Adhesive Rash    Iodine -  "Food Allergy Throat Swelling    Other      Seasonal, seafood    Shellfish-Derived Products - Food Allergy Tongue Swelling       Objective     /79   Pulse 87   Temp (!) 96.5 °F (35.8 °C) (Temporal)   Resp 17   Ht 5' 2\" (1.575 m)   Wt 80.3 kg (177 lb)   LMP 04/01/2013 (Approximate)   SpO2 97%   BMI 32.37 kg/m²       PHYSICAL EXAM    Gen: NAD  CV: RRR  CHEST: Clear  ABD: soft, NT/ND  EXT: no edema  Neuro: AAO      ASSESSMENT/PLAN:  This is a 43 y.o. year old female here for colon cancer screening evaluation.  Patient was explained about the risks and benefits of the procedure. Risks including but not limited to bleeding, infection, perforation were explained in detail.     PLAN:   Procedure: Colonoscopy.      "

## 2024-09-24 ENCOUNTER — APPOINTMENT (OUTPATIENT)
Dept: LAB | Facility: CLINIC | Age: 44
End: 2024-09-24
Payer: COMMERCIAL

## 2024-09-24 DIAGNOSIS — Z98.84 BARIATRIC SURGERY STATUS: ICD-10-CM

## 2024-09-24 DIAGNOSIS — K91.2 POSTSURGICAL MALABSORPTION: ICD-10-CM

## 2024-09-24 DIAGNOSIS — E66.811 OBESITY, CLASS I, BMI 30-34.9: ICD-10-CM

## 2024-09-24 DIAGNOSIS — Z48.815 ENCOUNTER FOR SURGICAL AFTERCARE FOLLOWING SURGERY OF DIGESTIVE SYSTEM: ICD-10-CM

## 2024-09-24 DIAGNOSIS — G47.33 OSA (OBSTRUCTIVE SLEEP APNEA): ICD-10-CM

## 2024-09-24 DIAGNOSIS — E66.9 OBESITY, CLASS I, BMI 30-34.9: ICD-10-CM

## 2024-09-24 LAB
25(OH)D3 SERPL-MCNC: 38.4 NG/ML (ref 30–100)
ALBUMIN SERPL BCG-MCNC: 4.5 G/DL (ref 3.5–5)
ALP SERPL-CCNC: 53 U/L (ref 34–104)
ALT SERPL W P-5'-P-CCNC: 28 U/L (ref 7–52)
ANION GAP SERPL CALCULATED.3IONS-SCNC: 8 MMOL/L (ref 4–13)
AST SERPL W P-5'-P-CCNC: 19 U/L (ref 13–39)
BILIRUB SERPL-MCNC: 0.45 MG/DL (ref 0.2–1)
BUN SERPL-MCNC: 11 MG/DL (ref 5–25)
CALCIUM SERPL-MCNC: 9.5 MG/DL (ref 8.4–10.2)
CHLORIDE SERPL-SCNC: 101 MMOL/L (ref 96–108)
CO2 SERPL-SCNC: 31 MMOL/L (ref 21–32)
CREAT SERPL-MCNC: 0.65 MG/DL (ref 0.6–1.3)
ERYTHROCYTE [DISTWIDTH] IN BLOOD BY AUTOMATED COUNT: 11.6 % (ref 11.6–15.1)
FERRITIN SERPL-MCNC: 164 NG/ML (ref 11–307)
FOLATE SERPL-MCNC: >22.3 NG/ML
GFR SERPL CREATININE-BSD FRML MDRD: 107 ML/MIN/1.73SQ M
GLUCOSE P FAST SERPL-MCNC: 89 MG/DL (ref 65–99)
HCT VFR BLD AUTO: 44.6 % (ref 34.8–46.1)
HGB BLD-MCNC: 14.5 G/DL (ref 11.5–15.4)
IRON SATN MFR SERPL: 30 % (ref 15–50)
IRON SERPL-MCNC: 106 UG/DL (ref 50–212)
MCH RBC QN AUTO: 30.1 PG (ref 26.8–34.3)
MCHC RBC AUTO-ENTMCNC: 32.5 G/DL (ref 31.4–37.4)
MCV RBC AUTO: 93 FL (ref 82–98)
PLATELET # BLD AUTO: 358 THOUSANDS/UL (ref 149–390)
PMV BLD AUTO: 9.1 FL (ref 8.9–12.7)
POTASSIUM SERPL-SCNC: 4 MMOL/L (ref 3.5–5.3)
PROT SERPL-MCNC: 7.8 G/DL (ref 6.4–8.4)
PTH-INTACT SERPL-MCNC: 28 PG/ML (ref 12–88)
RBC # BLD AUTO: 4.81 MILLION/UL (ref 3.81–5.12)
SODIUM SERPL-SCNC: 140 MMOL/L (ref 135–147)
TIBC SERPL-MCNC: 348 UG/DL (ref 250–450)
UIBC SERPL-MCNC: 242 UG/DL (ref 155–355)
VIT B12 SERPL-MCNC: 585 PG/ML (ref 180–914)
WBC # BLD AUTO: 10.18 THOUSAND/UL (ref 4.31–10.16)

## 2024-09-24 PROCEDURE — 84590 ASSAY OF VITAMIN A: CPT

## 2024-09-24 PROCEDURE — 84425 ASSAY OF VITAMIN B-1: CPT

## 2024-09-24 PROCEDURE — 83550 IRON BINDING TEST: CPT

## 2024-09-24 PROCEDURE — 85027 COMPLETE CBC AUTOMATED: CPT

## 2024-09-24 PROCEDURE — 82746 ASSAY OF FOLIC ACID SERUM: CPT

## 2024-09-24 PROCEDURE — 82306 VITAMIN D 25 HYDROXY: CPT

## 2024-09-24 PROCEDURE — 82607 VITAMIN B-12: CPT

## 2024-09-24 PROCEDURE — 83970 ASSAY OF PARATHORMONE: CPT

## 2024-09-24 PROCEDURE — 84630 ASSAY OF ZINC: CPT

## 2024-09-24 PROCEDURE — 80053 COMPREHEN METABOLIC PANEL: CPT

## 2024-09-24 PROCEDURE — 83540 ASSAY OF IRON: CPT

## 2024-09-24 PROCEDURE — 36415 COLL VENOUS BLD VENIPUNCTURE: CPT

## 2024-09-24 PROCEDURE — 82728 ASSAY OF FERRITIN: CPT

## 2024-09-26 LAB — ZINC SERPL-MCNC: 69 UG/DL (ref 44–115)

## 2024-09-27 LAB — VIT B1 BLD-SCNC: 146.1 NMOL/L (ref 66.5–200)

## 2024-10-01 ENCOUNTER — TELEPHONE (OUTPATIENT)
Dept: BARIATRICS | Facility: CLINIC | Age: 44
End: 2024-10-01

## 2024-10-01 LAB — VIT A SERPL-MCNC: 56.1 UG/DL (ref 20.1–62)

## 2024-10-01 NOTE — TELEPHONE ENCOUNTER
Spoke with pt and Went over the note per provider and she understood and asked if she still needs to follow up with us and I informed her that she still needs to come for her 18 month f/u so she will come for her follow and and had no further questions

## 2024-10-01 NOTE — TELEPHONE ENCOUNTER
----- Message from VALDO Berman sent at 10/1/2024 11:58 AM EDT -----  Please call pt to let her know :    Our office received your most recent labs results.  Your levels are within acceptable limits. Continue with vitamins as is and keep up the great work    Maurisio

## (undated) DEVICE — POWER SHELL SIGNIA

## (undated) DEVICE — ASTOUND STANDARD SURGICAL GOWN, XL: Brand: CONVERTORS

## (undated) DEVICE — TROCARS: Brand: KII® OPTICAL ACCESS SYSTEM

## (undated) DEVICE — VISUALIZATION SYSTEM: Brand: CLEARIFY

## (undated) DEVICE — VIOLET BRAIDED (POLYGLACTIN 910), SYNTHETIC ABSORBABLE SUTURE: Brand: COATED VICRYL

## (undated) DEVICE — DISPOSABLE OR TOWEL: Brand: CARDINAL HEALTH

## (undated) DEVICE — PMI DISPOSABLE PUNCTURE CLOSURE DEVICE / SUTURE GRASPER: Brand: PMI

## (undated) DEVICE — URETERAL CATHETER ADAPTOR TIP

## (undated) DEVICE — SUT MONOCRYL 4-0 PS-2 27 IN Y426H

## (undated) DEVICE — PLUMEPEN PRO 10FT

## (undated) DEVICE — COVIDIEN ENDO GIA PURPLE (MED) RELOAD 60MM

## (undated) DEVICE — SCD SEQUENTIAL COMPRESSION COMFORT SLEEVE MEDIUM KNEE LENGTH: Brand: KENDALL SCD

## (undated) DEVICE — METZENBAUM ADTEC SINGLE USE DISSECTING SCISSORS, SHAFT ONLY, MONOPOLAR, CURVED TO LEFT, WORKING LENGTH: 12 1/4", (310 MM), DIAM. 5 MM, INSULATED, DOUBLE ACTION, STERILE, DISPOSABLE, PACKAGE OF 10 PIECES: Brand: AESCULAP

## (undated) DEVICE — TROCAR: Brand: KII FIOS FIRST ENTRY

## (undated) DEVICE — STAPLE ENDO TRI-STAPLE 2.0 BLCK RELOAD XTRA THICK

## (undated) DEVICE — DRAPE EQUIPMENT RF WAND

## (undated) DEVICE — TIBURON LAPAROSCOPIC ABDOMINAL DRAPE: Brand: CONVERTORS

## (undated) DEVICE — SYRINGE 30ML LL

## (undated) DEVICE — 3000CC GUARDIAN II: Brand: GUARDIAN

## (undated) DEVICE — WEBRIL 6 IN UNSTERILE

## (undated) DEVICE — ADHESIVE SKIN CLSR DERMABOND NX

## (undated) DEVICE — TRAVELKIT CONTAINS FIRST STEP KIT (200ML EP-4 KIT) AND SOILED SCOPE BAG - 1 KIT: Brand: TRAVELKIT CONTAINS FIRST STEP KIT AND SOILED SCOPE BAG

## (undated) DEVICE — NEEDLE SPINAL18G X 3.5 IN QUINCKE

## (undated) DEVICE — SYRINGE 20ML LL

## (undated) DEVICE — INTENDED FOR TISSUE SEPARATION, AND OTHER PROCEDURES THAT REQUIRE A SHARP SURGICAL BLADE TO PUNCTURE OR CUT.: Brand: BARD-PARKER SAFETY BLADES SIZE 15, STERILE

## (undated) DEVICE — TROCAR VISIPORT

## (undated) DEVICE — TUBING SMOKE EVAC W/FILTRATION DEVICE PLUMEPORT ACTIV

## (undated) DEVICE — TUBING SUCTION 5MM X 12 FT

## (undated) DEVICE — GLOVE SRG BIOGEL 8

## (undated) DEVICE — ALLENTOWN LAP CHOLE APP PACK: Brand: CARDINAL HEALTH

## (undated) DEVICE — CHLORAPREP HI-LITE 26ML ORANGE

## (undated) DEVICE — VISIGI 3D®  CALIBRATION SYSTEM  SIZE 36FR SLEEVE/STD: Brand: BOEHRINGER® VISIGI 3D™ SLEEVE GASTRECTOMY CALIBRATION SYSTEM, SIZE 36FR

## (undated) DEVICE — [HIGH FLOW INSUFFLATOR,  DO NOT USE IF PACKAGE IS DAMAGED,  KEEP DRY,  KEEP AWAY FROM SUNLIGHT,  PROTECT FROM HEAT AND RADIOACTIVE SOURCES.]: Brand: PNEUMOSURE